# Patient Record
Sex: MALE | Race: WHITE | NOT HISPANIC OR LATINO | Employment: OTHER | URBAN - METROPOLITAN AREA
[De-identification: names, ages, dates, MRNs, and addresses within clinical notes are randomized per-mention and may not be internally consistent; named-entity substitution may affect disease eponyms.]

---

## 2019-06-25 ENCOUNTER — OFFICE VISIT (OUTPATIENT)
Dept: URGENT CARE | Facility: URGENT CARE | Age: 29
End: 2019-06-25
Payer: COMMERCIAL

## 2019-06-25 ENCOUNTER — ANCILLARY PROCEDURE (OUTPATIENT)
Dept: GENERAL RADIOLOGY | Facility: CLINIC | Age: 29
End: 2019-06-25
Attending: PHYSICIAN ASSISTANT
Payer: COMMERCIAL

## 2019-06-25 VITALS
WEIGHT: 171.8 LBS | TEMPERATURE: 98.4 F | HEART RATE: 98 BPM | DIASTOLIC BLOOD PRESSURE: 87 MMHG | SYSTOLIC BLOOD PRESSURE: 122 MMHG | OXYGEN SATURATION: 97 %

## 2019-06-25 DIAGNOSIS — R07.9 ACUTE CHEST PAIN: ICD-10-CM

## 2019-06-25 DIAGNOSIS — R07.89 MUSCULOSKELETAL CHEST PAIN: Primary | ICD-10-CM

## 2019-06-25 PROCEDURE — 93000 ELECTROCARDIOGRAM COMPLETE: CPT | Performed by: PHYSICIAN ASSISTANT

## 2019-06-25 PROCEDURE — 71046 X-RAY EXAM CHEST 2 VIEWS: CPT

## 2019-06-25 PROCEDURE — 99204 OFFICE O/P NEW MOD 45 MIN: CPT | Performed by: PHYSICIAN ASSISTANT

## 2019-06-25 ASSESSMENT — ENCOUNTER SYMPTOMS
ABDOMINAL PAIN: 0
PSYCHIATRIC NEGATIVE: 1
NAUSEA: 0
SHORTNESS OF BREATH: 0
FLANK PAIN: 0
HEADACHES: 0
SORE THROAT: 0
DIZZINESS: 0
FEVER: 0
BACK PAIN: 0
PALPITATIONS: 0
BLURRED VISION: 0
VOMITING: 0
COUGH: 0
BRUISES/BLEEDS EASILY: 0

## 2019-06-26 NOTE — PATIENT INSTRUCTIONS
Your EKG showed an incomplete right bundle branch block. As we discussed this can be a normal variant.  Your chest xray was normal.  Your chest pain is highly likely due to muscular pain given that you have tenderness on the chest wall right where your pain was located.  Recommend ice, ibuprofen. Avoid activities such as heavy lifting.  Monitor closely- if worsening chest pain, developing any shortness of breath, lightheadedness, palpitations, or any other new, concerning symptoms, go to the ER immediately.  Otherwise, follow up with your doctor if no improvement in 1 week.

## 2019-06-26 NOTE — PROGRESS NOTES
SUBJECTIVE:   Stas Hollis is a 28 year old male presenting for evaluation of   Chief Complaint   Patient presents with     Urgent Care     Pt in clinic c/o left side chest pain for the past two hours.     Chest Pain   .  Chest pain began 2 hours ago while he was sitting at his desk working on the computer. Chest pain seems to come and go- is worse with really deep inspiration, and certain movements such as getting out of the car.  Chest pain located on left side of chest.   No SOB. No cough. No leg swelling or calf pain. No PMH blood clots. No palpitations. No recent surgeries. He traveled to and from [facility], but had lots of layovers so was only sedentary for 2 hours at a time.     No recent new activities or heavy lifting. He denies chest tenderness when he touches the chest.  No abdominal pain, nausea, vomiting. No back pain.  He has not tried any treatments for chest pain.    Review of Systems   Constitutional: Negative for fever.   HENT: Negative for sore throat.    Eyes: Negative for blurred vision.   Respiratory: Negative for cough and shortness of breath.    Cardiovascular: Positive for chest pain. Negative for palpitations and leg swelling.   Gastrointestinal: Negative for abdominal pain, nausea and vomiting.   Genitourinary: Negative for flank pain.   Musculoskeletal: Negative for back pain.   Skin: Negative for rash.   Neurological: Negative for dizziness and headaches.   Endo/Heme/Allergies: Does not bruise/bleed easily.   Psychiatric/Behavioral: Negative.          PMH:  Past Medical History:   Diagnosis Date     NO ACTIVE PROBLEMS      There are no active problems to display for this patient.        Current medications:  No current outpatient medications on file.       Surgical History:  Past Surgical History:   Procedure Laterality Date     no prior surgeries         Family history:  Family History   Problem Relation Age of Onset     Cerebrovascular Disease Maternal Grandmother       Cerebrovascular Disease Maternal Grandfather          Social History:  Social History     Tobacco Use     Smoking status: Never Smoker     Smokeless tobacco: Never Used   Substance Use Topics     Alcohol use: Not on file           OBJECTIVE:  /87   Pulse 98   Temp 98.4  F (36.9  C) (Tympanic)   Wt 77.9 kg (171 lb 12.8 oz)   SpO2 97%     Physical Exam  General: alert, appears well but slightly anxious, NAD. Afebrile.  Skin: no suspicious lesions or rashes of chest.  Respiratory: No distress. Equal inspiration to bilateral bases. No crackles wheeze, rhonchi, rales.   Cardiovascular: RRR. No murmurs, clicks, gallups, or rub. No peripheral edema. Peripheral pulses 2+ bilaterally.  Gastrointestinal: Abdomen soft, nontender, BS present. No masses, organomegaly.  Musculoskeletal: there is tenderness of the left side of chest wall, under the nipple line.  Neurologic: Follows commands.   Psychiatric: mentation appears normal and affect normal/bright           Labs:  Results for orders placed or performed in visit on 06/25/19 (from the past 24 hour(s))   XR Chest 2 Views    Narrative    CHEST TWO VIEW 6/25/2019 8:06 PM     HISTORY: Acute chest pain.     COMPARISON: None.     FINDINGS: Cardiomediastinal silhouette within normal limits. No focal  airspace opacities. No pleural effusion. No pneumothorax identified.  The bones are unremarkable.       Impression    IMPRESSION: No acute cardiopulmonary abnormalities.        X-Ray was done, my findings are: no pneumothorax, effusion, or infiltrate      EKG per my read:  Rate: 86  Rhythm: sinus  Conduction: incomplete RBBB  Axis: normal  Ischemic changes/ST segments: no ST elevations  Comparison to prior: Prior EKG none on file    Overall impression: sinus rhythm with incomplete RBBB        ASSESSMENT/PLAN:      ICD-10-CM    1. Musculoskeletal chest pain R07.89    2. Acute chest pain R07.9 EKG 12-lead complete w/read - Clinics     XR Chest 2 Views        Medical Decision  Making:    Serious Comorbid Conditions:  none    Previously healthy 29yo male presenting with acute intermittent chest pain x 2 hours. Patient was vitally normal upon presentation. Differential includes but is not limited to: GERD, PE, ACS, musculoskeletal chest pain, pneumonia, pneumothorax.  I highly suspect etiology to be musculoskeletal in origin given that the pain is completely reproducible upon exam today- when I palpate the left side of chest under the nipple line, patient says this is the same kind of pain he had been having.   I did obtain EKG looking for ischemic changes. There was an incomplete RBBB, but unclear if this is his baseline or not as there are no prior EKGs on file. I highly doubt that the incomplete RBBB is reflective of ACS, however, given that he has no ACS risk factors and is young and healthy. I do not think he needs to be sent to the ED today for serial troponins.  I highly doubt PE as he denies SOB, nor does he have any PE risk factors. PERC criteria 0.   I do not think further evaluation is indicated at this poitn for patient's chest pain.   I recommend ice, ibuprofen for likely musculoskeletal chest pain.  Recommend close monitoring and if worsening or any new symptoms to go to the ED immediately. Otherwise, f/up with PCP if no improvement in 1 week.    At the end of the encounter, I discussed all available results with patient. Discussed diagnosis and treatment plan.   Return precautions provided to patient and printed below in patient instructions.  Patient understood and agreed to plan. Patient was appropriate for discharge.        Patient Instructions   Your EKG showed an incomplete right bundle branch block. As we discussed this can be a normal variant.  Your chest xray was normal.  Your chest pain is highly likely due to muscular pain given that you have tenderness on the chest wall right where your pain was located.  Recommend ice, ibuprofen. Avoid activities such as heavy  lifting.  Monitor closely- if worsening chest pain, developing any shortness of breath, lightheadedness, palpitations, or any other new, concerning symptoms, go to the ER immediately.  Otherwise, follow up with your doctor if no improvement in 1 week.              Radha Bansal PA-C  06/25/19 8:56 PM

## 2019-06-27 ENCOUNTER — OFFICE VISIT (OUTPATIENT)
Dept: CARDIOLOGY | Facility: CLINIC | Age: 29
End: 2019-06-27
Attending: INTERNAL MEDICINE
Payer: COMMERCIAL

## 2019-06-27 ENCOUNTER — PRE VISIT (OUTPATIENT)
Dept: CARDIOLOGY | Facility: CLINIC | Age: 29
End: 2019-06-27

## 2019-06-27 VITALS
WEIGHT: 173 LBS | DIASTOLIC BLOOD PRESSURE: 80 MMHG | HEIGHT: 68 IN | BODY MASS INDEX: 26.22 KG/M2 | SYSTOLIC BLOOD PRESSURE: 122 MMHG | OXYGEN SATURATION: 98 % | HEART RATE: 86 BPM

## 2019-06-27 DIAGNOSIS — I45.10 INCOMPLETE RIGHT BUNDLE BRANCH BLOCK: ICD-10-CM

## 2019-06-27 DIAGNOSIS — R07.89 MUSCULOSKELETAL CHEST PAIN: ICD-10-CM

## 2019-06-27 DIAGNOSIS — G47.30 SLEEP APNEA, UNSPECIFIED TYPE: Primary | ICD-10-CM

## 2019-06-27 PROCEDURE — G0463 HOSPITAL OUTPT CLINIC VISIT: HCPCS | Mod: ZF

## 2019-06-27 PROCEDURE — 99204 OFFICE O/P NEW MOD 45 MIN: CPT | Mod: ZP | Performed by: INTERNAL MEDICINE

## 2019-06-27 ASSESSMENT — ENCOUNTER SYMPTOMS
MUSCLE CRAMPS: 0
EXERCISE INTOLERANCE: 0
MYALGIAS: 1
BACK PAIN: 0
DECREASED CONCENTRATION: 0
MUSCLE WEAKNESS: 0
ARTHRALGIAS: 0
NECK PAIN: 1
PANIC: 0
LIGHT-HEADEDNESS: 0
ORTHOPNEA: 0
PALPITATIONS: 0
SLEEP DISTURBANCES DUE TO BREATHING: 0
SYNCOPE: 0
DEPRESSION: 0
STIFFNESS: 0
NERVOUS/ANXIOUS: 1
JOINT SWELLING: 0
HYPERTENSION: 0
INSOMNIA: 1
LEG PAIN: 0
HYPOTENSION: 0

## 2019-06-27 ASSESSMENT — PAIN SCALES - GENERAL: PAINLEVEL: NO PAIN (0)

## 2019-06-27 ASSESSMENT — MIFFLIN-ST. JEOR: SCORE: 1724.22

## 2019-06-27 NOTE — PATIENT INSTRUCTIONS
"You were seen today in the Cardiovascular Clinic at the [facility].     Cardiology Providers you saw during your visit: Dr. Vianey Rasmussen    Diagnosis:  Incomplete right bundle branch block    Results: discussed with patient    Orders:   None    Medication Changes:   None    Recommendations:   Echocardiogram  Sleep study- Please call to schedule appointment       Follow-up:   As needed  Please follow up with primary care provider for medication refills         Please feel free to call me with any questions or concerns.       Jocy Oshea RN     Questions and [phone].   First press #1 for the [employer] and then press #3 for \"Medical Questions\" to reach us Cardiology Nurses.      On Call Cardiologist for after hours or on weekends: [phone]   option #4 and ask to speak to the on-call Cardiologist.          If you need a medication refill please contact your pharmacy.  Please allow 3 business days for your refill to be completed.    "

## 2019-06-27 NOTE — LETTER
6/27/2019      RE: Stas Hollis  [address]  [place] [address]       Dear Colleague,    Thank you for the opportunity to participate in the care of your patient, Stas Hollis, at the [facility] HEART [facility]. Please see a copy of my visit note below.    I am delighted to see Stas Hollis in consultation for EKG finding.     History of Present Illness:  As you know, the patient is a 28 year old  Male who saw PCP yesterday for chest pain at rest. EKG noted to have incomplete right bundle branch block. Chest pain started while he was sitting at work, sharp, left sided, waxed/waned for several hours, increased with deep inspiration and movement. He denies any unusual physical activities leading prior to chest pain. In ED, CXR without pneumothorax. Labs normal. EKG showed incomplete RBBB. Chest pain resolved spontaneously, no recurrences.    He is  and lives with his wife, no children. They have a 75 lb dog who likes to jump on him. He takes regular walks, takes stairs up to his 5th floor office as exercise. No chest pain, dyspnea, dizziness, syncope. Does report waking up in the middle of the night for no particular reason. Wife says he snores.      The following portions of the patient's history were reviewed and updated as appropriate: allergies, current medications, past family history, past medical history, past social history, past surgical history, and the problem list.      Past Medical History:  None      Medications:   None      Allergies:  No Known Allergies      Family History:   Father - hypertension, hyperlipidemia, sleep apnea    Family History   Problem Relation Age of Onset     Cerebrovascular Disease Maternal Grandmother      Cerebrovascular Disease Maternal Grandfather        Psychosocial history:  reports that he has never smoked. He has never used smokeless tobacco.    Review of systems:   Cardiovascular: No  palpitations, chest pain, shortness of breath at rest, dyspnea with exertion, orthopnea, paroxysmal nocturia dyspnea, nocturia, dizziness, syncope.    In addition,   Constitutional: No change in weight, sleep or appetite.  Normal energy.  No fever or chills  Eyes: Negative for vision changes or eye problems  ENT: No problems with ears, nose or throat.  No difficulty swallowing.  Resp: No coughing, wheezing or shortness of breath  GI: No nausea, vomiting,  heartburn, abdominal pain, diarrhea, constipation or change in bowel habits  : No urinary frequency or dysuria, bladder or kidney problems  Musculoskeletal: No significant muscle or joint pains  Neurologic: No headaches, numbness, tingling, weakness, problems with balance or coordination  Psychiatric: No problems with anxiety, depression or mental health  Heme/immune/allergy: No history of bleeding or clotting problems or anemia.  No allergies or immune system problems  Integumentary: No rashes,worrisome lesions or skin problems      Physical examination  Vitals: 122/80, 86 bpm  BMI= 26    Constitutional: In general, the patient is a pleasant male in no apparent distress.    Eyes: PERRLA.  EOMI.  Sclerae white, not injected.  ENT/mouth: Normiocephalic and atraumatic.  Nares clear.  Pharynx without erythema or exudate.  Dentition intact.  No adenopathy.  No thyromegaly. Carotids +2/2 bilaterally without bruits.  No jugular venous distension.   Card/Vasc: The PMI is in the 5th ICS in the midclavicular line. There is no heave. Regular rate and rhythm. Normal S1, S2. No murmur, rub, click, or gallop. Pulses are normal bilaterally throughout. No peripheral edema.  Respiratory: Clear to asculation.  No ronchi, wheezes, rales.  No dullness to percussion.   GI: Abdomen is soft, nontender, nondistended. No organomegaly. No AAA.  No bruits.   Integument: No significant bruises or rashes  Neurological: The neurological examination reveal a patient who was oriented to  person, place, and time.    Psych: Normal  Heme/Lymph/Immun: no significant adenopathy      I have personally and independently reviewed the following:  EK19: sinus, incomplete RBBB      Assessment :  1. Chest pain. Non cardiac. Likely musculoskeletal.  2. Incomplete RBBB on EKG. Nonspecific. Normal exam. Consider checking for sleep apnea, echocardiogram to evaluate RV.      Plan:  2D echo  Sleep study referral    I spent a total of 30 minutes face to face with  Stas Hollis during today's office visit. Over 50% of this time was spent counseling the patient and/or coordinating care regarding management strategies.    The patient is to return pending above . The patient understood the treatment plan as outlined above.  There were no barriers to learning.      Vianey Rasmussen MD

## 2019-06-27 NOTE — PROGRESS NOTES
I am delighted to see Stas Hollis in consultation for EKG finding.     History of Present Illness:  As you know, the patient is a 28 year old  Male who saw PCP yesterday for chest pain at rest. EKG noted to have incomplete right bundle branch block. Chest pain started while he was sitting at work, sharp, left sided, waxed/waned for several hours, increased with deep inspiration and movement. He denies any unusual physical activities leading prior to chest pain. In ED, CXR without pneumothorax. Labs normal. EKG showed incomplete RBBB. Chest pain resolved spontaneously, no recurrences.    He is  and lives with his wife, no children. They have a 75 lb dog who likes to jump on him. He takes regular walks, takes stairs up to his 5th floor office as exercise. No chest pain, dyspnea, dizziness, syncope. Does report waking up in the middle of the night for no particular reason. Wife says he snores.      The following portions of the patient's history were reviewed and updated as appropriate: allergies, current medications, past family history, past medical history, past social history, past surgical history, and the problem list.      Past Medical History:  None      Medications:   None      Allergies:  No Known Allergies      Family History:   Father - hypertension, hyperlipidemia, sleep apnea    Family History   Problem Relation Age of Onset     Cerebrovascular Disease Maternal Grandmother      Cerebrovascular Disease Maternal Grandfather        Psychosocial history:  reports that he has never smoked. He has never used smokeless tobacco.    Review of systems:   Cardiovascular: No palpitations, chest pain, shortness of breath at rest, dyspnea with exertion, orthopnea, paroxysmal nocturia dyspnea, nocturia, dizziness, syncope.    In addition,   Constitutional: No change in weight, sleep or appetite.  Normal energy.  No fever or chills  Eyes: Negative for vision changes or eye problems  ENT: No problems with  ears, nose or throat.  No difficulty swallowing.  Resp: No coughing, wheezing or shortness of breath  GI: No nausea, vomiting,  heartburn, abdominal pain, diarrhea, constipation or change in bowel habits  : No urinary frequency or dysuria, bladder or kidney problems  Musculoskeletal: No significant muscle or joint pains  Neurologic: No headaches, numbness, tingling, weakness, problems with balance or coordination  Psychiatric: No problems with anxiety, depression or mental health  Heme/immune/allergy: No history of bleeding or clotting problems or anemia.  No allergies or immune system problems  Integumentary: No rashes,worrisome lesions or skin problems      Physical examination  Vitals: 122/80, 86 bpm  BMI= 26    Constitutional: In general, the patient is a pleasant male in no apparent distress.    Eyes: PERRLA.  EOMI.  Sclerae white, not injected.  ENT/mouth: Normiocephalic and atraumatic.  Nares clear.  Pharynx without erythema or exudate.  Dentition intact.  No adenopathy.  No thyromegaly. Carotids +2/2 bilaterally without bruits.  No jugular venous distension.   Card/Vasc: The PMI is in the 5th ICS in the midclavicular line. There is no heave. Regular rate and rhythm. Normal S1, S2. No murmur, rub, click, or gallop. Pulses are normal bilaterally throughout. No peripheral edema.  Respiratory: Clear to asculation.  No ronchi, wheezes, rales.  No dullness to percussion.   GI: Abdomen is soft, nontender, nondistended. No organomegaly. No AAA.  No bruits.   Integument: No significant bruises or rashes  Neurological: The neurological examination reveal a patient who was oriented to person, place, and time.    Psych: Normal  Heme/Lymph/Immun: no significant adenopathy      I have personally and independently reviewed the following:  EK19: sinus, incomplete RBBB      Assessment :  1. Chest pain. Non cardiac. Likely musculoskeletal.  2. Incomplete RBBB on EKG. Nonspecific. Normal exam. Consider checking for  sleep apnea, echocardiogram to evaluate RV.      Plan:  2D echo  Sleep study referral        I spent a total of 30 minutes face to face with  Stas Hollis during today's office visit. Over 50% of this time was spent counseling the patient and/or coordinating care regarding management strategies.      The patient is to return pending above . The patient understood the treatment plan as outlined above.  There were no barriers to learning.      Vianey Rasmussen MD

## 2019-06-27 NOTE — NURSING NOTE
Chief Complaint   Patient presents with     New Patient     29 yo male present for Incomplete right bundle branch block- abnormal EKG     Vitals were taken and medications were reconciled. DEBRA Beyer MA    7:35 AM

## 2019-07-09 ENCOUNTER — ANCILLARY PROCEDURE (OUTPATIENT)
Dept: CARDIOLOGY | Facility: CLINIC | Age: 29
End: 2019-07-09
Attending: INTERNAL MEDICINE
Payer: COMMERCIAL

## 2019-07-09 DIAGNOSIS — I45.10 INCOMPLETE RIGHT BUNDLE BRANCH BLOCK: ICD-10-CM

## 2019-07-10 ENCOUNTER — MYC MEDICAL ADVICE (OUTPATIENT)
Dept: CARDIOLOGY | Facility: CLINIC | Age: 29
End: 2019-07-10

## 2019-07-12 ENCOUNTER — OFFICE VISIT (OUTPATIENT)
Dept: SURGERY | Facility: CLINIC | Age: 29
End: 2019-07-12
Attending: DERMATOLOGY
Payer: COMMERCIAL

## 2019-07-12 VITALS
HEIGHT: 68 IN | TEMPERATURE: 98 F | SYSTOLIC BLOOD PRESSURE: 131 MMHG | DIASTOLIC BLOOD PRESSURE: 83 MMHG | BODY MASS INDEX: 26.86 KG/M2 | OXYGEN SATURATION: 99 % | HEART RATE: 96 BPM | WEIGHT: 177.2 LBS | RESPIRATION RATE: 14 BRPM

## 2019-07-12 DIAGNOSIS — D18.01 CHERRY ANGIOMA: ICD-10-CM

## 2019-07-12 DIAGNOSIS — Z86.018 HISTORY OF DYSPLASTIC NEVUS: Primary | ICD-10-CM

## 2019-07-12 DIAGNOSIS — D23.9 DILATED PORE OF WINER: ICD-10-CM

## 2019-07-12 DIAGNOSIS — L81.4 SOLAR LENTIGO: ICD-10-CM

## 2019-07-12 DIAGNOSIS — L57.8 SUN-DAMAGED SKIN: ICD-10-CM

## 2019-07-12 DIAGNOSIS — D22.9 MULTIPLE BENIGN NEVI: ICD-10-CM

## 2019-07-12 PROCEDURE — G0463 HOSPITAL OUTPT CLINIC VISIT: HCPCS

## 2019-07-12 PROCEDURE — G0463 HOSPITAL OUTPT CLINIC VISIT: HCPCS | Mod: ZF

## 2019-07-12 RX ORDER — LIDOCAINE HYDROCHLORIDE AND EPINEPHRINE 10; 10 MG/ML; UG/ML
1.5 INJECTION, SOLUTION INFILTRATION; PERINEURAL ONCE
Status: DISCONTINUED | OUTPATIENT
Start: 2019-07-12 | End: 2023-10-03

## 2019-07-12 ASSESSMENT — MIFFLIN-ST. JEOR: SCORE: 1743.14

## 2019-07-12 ASSESSMENT — PAIN SCALES - GENERAL: PAINLEVEL: NO PAIN (0)

## 2019-07-12 NOTE — LETTER
7/12/2019       RE: Stas Hollis  [address]  [place] [address]     Dear Colleague,    Thank you for referring your patient, Stas Hollis, to the [facility] CANCER [facility]IC. Please see a copy of my visit note below.    [facility] Dermatology Note      Dermatology Problem List:  1. History of dysplastic nevus  -Right buttock, mild atypia, s/p biopsy 7/24/18 at [facility]  2. Family history of skin cancer in sister, unknown type.    Encounter Date: Jul 12, 2019    CC:  Chief Complaint   Patient presents with     Oncology Clinic Visit     [facility] NEW- [facility]         History of Present Illness:  Mr. Stas Hollis is a 28 year old male with history of DN who presents in self referral for a skin check. He thinks his family has a history of unknown type of skin cancer in maternal grandfather and sister. Patient has a history of mildly dysplastic nevus on the right buttocks diagnosed last year. No other concerning moles in the past. Denies any tender, nonhealing, bleeding skin lesions. No other concerns addressed today.    Past Medical History:     Past Medical History:   Diagnosis Date     NO ACTIVE PROBLEMS      Past Surgical History:   Procedure Laterality Date     no prior surgeries         Social History:  Patient reports that he has never smoked. He has never used smokeless tobacco. Works on [employer], looking for aquatic invasive species. He is .    Family History:  Family history of skin cancer of maternal grandfather, he believed it to be melanoma but was unsure. Thinks sister had a dysplastic nevus, but he was also clarify this.     Medications:  No current outpatient medications on file.       No Known Allergies    Review of Systems:  -Constitutional: Patient is otherwise feeling well, in usual state of health.   -Skin: As above in HPI. No additional skin concerns.    Physical exam:  Vitals: /83 (BP Location: Right arm, Patient  "Position: Chair, Cuff Size: Adult Regular)   Pulse 96   Temp 98  F (36.7  C) (Oral)   Resp 14   Ht 1.727 m (5' 7.99\")   Wt 80.4 kg (177 lb 3.2 oz)   SpO2 99%   BMI 26.95 kg/m     GEN: This is a well developed, well-nourished male in no acute distress, in a pleasant mood.    SKIN: Total skin excluding the undergarment areas was performed. The exam included the head/face, neck, both arms, chest, back, abdomen, both legs, digits and/or nails, including buttocks and genital area.    - Parmar Type I-II  - Scattered brown macules on sun exposed areas.  - Dilated pore of chante left forehead.  - well healed circular scar on left medial buttock. No pigment recurrence.   - There are dome shaped bright red papules on the trunk.   - Multiple regular brown pigmented macules and papules are identified on the trunk and extremities.   - No other lesions of concern on areas examined.       Impression/Plan:  1. History of dysplastic nevus: No evidence of recurrence. No new lesions of concern. Given history of only one mildly atypical nevus, no need to follow in high risk melanoma clinic in the future. Will refer to general dermatology.     2. Family history of skin cancer: Patient will try to clarify further with family to determine what types of skin cancer.    3. Sun damaged skin with solar lentigines    Recommend sunscreens SPF #30 or greater, protective clothing and avoidance of tanning beds.    4. Benign findings including:  cherry angioma, dilated pore of chante    No further intervention required. Patient to report changes.     Patient reassured of the benign nature of these lesions.    5. Multiple clinically benign nevi, no significant atypia    No further intervention required. Patient to report changes.     Patient reassured of the benign nature of these lesions.    [facility] on close of this encounter.    Follow-up in 2-3 years in general dermatology, earlier for new or changing " lesions.       Staff Involved:  Scribe/Staff    Scribe Disclosure  I, Sulmaaster Monet, am serving as a scribe to document services personally performed by Dr. Ariana Ahn MD, based on data collection and the provider's statements to me.     Provider Disclosure:   The documentation recorded by the scribe accurately reflects the services I personally performed and the decisions made by me.    Ariana Ahn MD    Department of Dermatology  [facility]: Phone: [phone], Fax:[phone]  [facility]: Phone: [phone], Fax: [phone]

## 2019-07-12 NOTE — NURSING NOTE
"Oncology Rooming Note    July 12, 2019 1:49 PM   Stas Hollis is a 28 year old male who presents for:    Chief Complaint   Patient presents with     Oncology Clinic Visit     [facility] NEW- [facility]     Initial Vitals: /83 (BP Location: Right arm, Patient Position: Chair, Cuff Size: Adult Regular)   Pulse 96   Temp 98  F (36.7  C) (Oral)   Resp 14   Ht 1.727 m (5' 7.99\")   Wt 80.4 kg (177 lb 3.2 oz)   SpO2 99%   BMI 26.95 kg/m   Estimated body mass index is 26.95 kg/m  as calculated from the following:    Height as of this encounter: 1.727 m (5' 7.99\").    Weight as of this encounter: 80.4 kg (177 lb 3.2 oz). Body surface area is 1.96 meters squared.  No Pain (0) Comment: Data Unavailable   No LMP for male patient.  Allergies reviewed: Yes  Medications reviewed: Yes    Medications: Medication refills not needed today.  Pharmacy name entered into [employer]: [id number] - [place], [place] - [address] AT [facility] [address]    Clinical concerns: No new concerns. Anabelle was notified.      Nilesh Regan LPN            "

## 2019-07-12 NOTE — PROGRESS NOTES
"[facility] Dermatology Note      Dermatology Problem List:  1. History of dysplastic nevus  -Right buttock, mild atypia, s/p biopsy 7/24/18 at [facility]  2. Family history of skin cancer in sister, unknown type.    Encounter Date: Jul 12, 2019    CC:  Chief Complaint   Patient presents with     Oncology Clinic Visit     [facility] NEW- MELANOMA         History of Present Illness:  Mr. Stas Hollis is a 28 year old male with history of DN who presents in self referral for a skin check. He thinks his family has a history of unknown type of skin cancer in maternal grandfather and sister. Patient has a history of mildly dysplastic nevus on the right buttocks diagnosed last year. No other concerning moles in the past. Denies any tender, nonhealing, bleeding skin lesions. No other concerns addressed today.    Past Medical History:     Past Medical History:   Diagnosis Date     NO ACTIVE PROBLEMS      Past Surgical History:   Procedure Laterality Date     no prior surgeries         Social History:  Patient reports that he has never smoked. He has never used smokeless tobacco. Works on [employer], looking for aquatic invasive species. He is .    Family History:  Family history of skin cancer of maternal grandfather, he believed it to be melanoma but was unsure. Thinks sister had a dysplastic nevus, but he was also clarify this.     Medications:  No current outpatient medications on file.       No Known Allergies    Review of Systems:  -Constitutional: Patient is otherwise feeling well, in usual state of health.   -Skin: As above in HPI. No additional skin concerns.    Physical exam:  Vitals: /83 (BP Location: Right arm, Patient Position: Chair, Cuff Size: Adult Regular)   Pulse 96   Temp 98  F (36.7  C) (Oral)   Resp 14   Ht 1.727 m (5' 7.99\")   Wt 80.4 kg (177 lb 3.2 oz)   SpO2 99%   BMI 26.95 kg/m    GEN: This is a well developed, well-nourished male in no acute distress, " in a pleasant mood.    SKIN: Total skin excluding the undergarment areas was performed. The exam included the head/face, neck, both arms, chest, back, abdomen, both legs, digits and/or nails, including buttocks and genital area.    - Parmar Type I-II  - Scattered brown macules on sun exposed areas.  - Dilated pore of chante left forehead.  - well healed circular scar on left medial buttock. No pigment recurrence.   - There are dome shaped bright red papules on the trunk.   - Multiple regular brown pigmented macules and papules are identified on the trunk and extremities.   - No other lesions of concern on areas examined.       Impression/Plan:  1. History of dysplastic nevus: No evidence of recurrence. No new lesions of concern. Given history of only one mildly atypical nevus, no need to follow in high risk melanoma clinic in the future. Will refer to general dermatology.     2. Family history of skin cancer: Patient will try to clarify further with family to determine what types of skin cancer.    3. Sun damaged skin with solar lentigines    Recommend sunscreens SPF #30 or greater, protective clothing and avoidance of tanning beds.    4. Benign findings including:  cherry angioma, dilated pore of chante    No further intervention required. Patient to report changes.     Patient reassured of the benign nature of these lesions.    5. Multiple clinically benign nevi, no significant atypia    No further intervention required. Patient to report changes.     Patient reassured of the benign nature of these lesions.    [facility] on close of this encounter.    Follow-up in 2-3 years in general dermatology, earlier for new or changing lesions.       Staff Involved:  Scribe/Staff    Scribe Disclosure  I, Sulma Monet, am serving as a scribe to document services personally performed by Dr. Ariana Ahn MD, based on data collection and the provider's statements to me.     Provider  Disclosure:   The documentation recorded by the scribe accurately reflects the services I personally performed and the decisions made by me.    Ariana Ahn MD    Department of Dermatology  [facility]: Phone: [phone], Fax:[phone]  [facility]: Phone: [phone], Fax: [phone]

## 2020-03-11 ENCOUNTER — HEALTH MAINTENANCE LETTER (OUTPATIENT)
Age: 30
End: 2020-03-11

## 2021-01-03 ENCOUNTER — HEALTH MAINTENANCE LETTER (OUTPATIENT)
Age: 31
End: 2021-01-03

## 2021-04-25 ENCOUNTER — HEALTH MAINTENANCE LETTER (OUTPATIENT)
Age: 31
End: 2021-04-25

## 2021-05-05 ENCOUNTER — OFFICE VISIT (OUTPATIENT)
Dept: FAMILY MEDICINE | Facility: CLINIC | Age: 31
End: 2021-05-05
Payer: COMMERCIAL

## 2021-05-05 VITALS
RESPIRATION RATE: 12 BRPM | SYSTOLIC BLOOD PRESSURE: 125 MMHG | HEART RATE: 92 BPM | DIASTOLIC BLOOD PRESSURE: 81 MMHG | OXYGEN SATURATION: 98 % | WEIGHT: 185 LBS | TEMPERATURE: 97.7 F | BODY MASS INDEX: 28.04 KG/M2 | HEIGHT: 68 IN

## 2021-05-05 DIAGNOSIS — M25.675 FOOT STIFFNESS, LEFT: ICD-10-CM

## 2021-05-05 DIAGNOSIS — Z13.220 SCREENING FOR HYPERLIPIDEMIA: ICD-10-CM

## 2021-05-05 DIAGNOSIS — L98.9 SKIN LESION: ICD-10-CM

## 2021-05-05 DIAGNOSIS — Z76.89 ENCOUNTER TO ESTABLISH CARE: Primary | ICD-10-CM

## 2021-05-05 DIAGNOSIS — Z80.8 FAMILY HISTORY OF SKIN CANCER: ICD-10-CM

## 2021-05-05 LAB
CHOLEST SERPL-MCNC: 158 MG/DL
HDLC SERPL-MCNC: 53 MG/DL
LDLC SERPL CALC-MCNC: 90 MG/DL
NONHDLC SERPL-MCNC: 105 MG/DL
TRIGL SERPL-MCNC: 75 MG/DL

## 2021-05-05 PROCEDURE — 80061 LIPID PANEL: CPT | Performed by: PHYSICIAN ASSISTANT

## 2021-05-05 PROCEDURE — 99203 OFFICE O/P NEW LOW 30 MIN: CPT | Performed by: PHYSICIAN ASSISTANT

## 2021-05-05 PROCEDURE — 36415 COLL VENOUS BLD VENIPUNCTURE: CPT | Performed by: PHYSICIAN ASSISTANT

## 2021-05-05 ASSESSMENT — MIFFLIN-ST. JEOR: SCORE: 1768.65

## 2021-05-05 NOTE — PROGRESS NOTES
"    Assessment & Plan     Encounter to establish care  Skin lesion  Family history of skin cancer  Grandfather with melanoma history and sister with history of precancerous skin lesion. Last dermatology appointment 7/24/2018 noted to have multiple benign nevi, lentigines, cherry angioma, and sun damaged skin. Biopsy showed mildly dysplastic nevus. Was advised to return to care in 1 year for skin check. Referral placed today.   - DERMATOLOGY ADULT REFERRAL; Future    Foot stiffness, left  Not overly bothersome right now. Symptoms resolve after a few steps in the morning. If worsening symptoms to return to care. Could consider massage with frozen water bottle, PT for stretching/strengthening.     Screening for hyperlipidemia  - Lipid panel reflex to direct LDL Non-fasting    30 minutes spent on the date of the encounter doing chart review, history and exam, documentation and further activities per the note     BMI:   Estimated body mass index is 28.13 kg/m  as calculated from the following:    Height as of this encounter: 1.727 m (5' 8\").    Weight as of this encounter: 83.9 kg (185 lb).   Weight management plan: Discussed healthy diet and exercise guidelines      Return in about 1 year (around 5/5/2022) for Routine Visit, Physical Exam, Lab Work, BP Recheck.    Silvio Odom PA-C  [facility]   Pat is a 30 year old who presents for the following health issues     HPI   Encounter to establish care  Previously followed with dermatology. Reports family history of skin cancer. Grandfather with melanoma and sister with precancerous mole. Last derm evaluation 7/2019. Had biopsy done showing mildy dysplastic nevus. Advised to follow up in 1 year for repeat skin check. Requesting referral today.     Other: waking up in the morning notices some left foot stiffness that goes away after a few steps. Going on for a few months. No swelling. No injury or trauma. Denies overuse.     Social: " "   - wife  Works at [employer]    PMH:  Incomplete RBBB - had normal echo and was cleared by cardiology.     Family:   Grandfather with melanoma  Sister with precancerous mole       Review of Systems   Constitutional, HEENT, cardiovascular, pulmonary, gi and gu systems are negative, except as otherwise noted.      Objective    /81 (BP Location: Right arm, Patient Position: Sitting, Cuff Size: Adult Regular)   Pulse 92   Temp 97.7  F (36.5  C) (Tympanic)   Resp 12   Ht 1.727 m (5' 8\")   Wt 83.9 kg (185 lb)   SpO2 98%   BMI 28.13 kg/m    Body mass index is 28.13 kg/m .  Physical Exam  Vitals signs and nursing note reviewed.   Constitutional:       Appearance: Normal appearance.   Pulmonary:      Effort: Pulmonary effort is normal.   Neurological:      General: No focal deficit present.      Mental Status: He is alert.   Psychiatric:         Mood and Affect: Mood normal.         Behavior: Behavior normal.        Results for orders placed or performed in visit on 05/05/21   Lipid panel reflex to direct LDL Non-fasting     Status: None   Result Value Ref Range    Cholesterol 158 <200 mg/dL    Triglycerides 75 <150 mg/dL    HDL Cholesterol 53 >39 mg/dL    LDL Cholesterol Calculated 90 <100 mg/dL    Non HDL Cholesterol 105 <130 mg/dL               "

## 2021-07-01 ENCOUNTER — OFFICE VISIT (OUTPATIENT)
Dept: DERMATOLOGY | Facility: CLINIC | Age: 31
End: 2021-07-01
Attending: PHYSICIAN ASSISTANT
Payer: COMMERCIAL

## 2021-07-01 DIAGNOSIS — Z12.83 SKIN EXAM FOR MALIGNANT NEOPLASM: Primary | ICD-10-CM

## 2021-07-01 DIAGNOSIS — D18.01 CHERRY ANGIOMA: ICD-10-CM

## 2021-07-01 DIAGNOSIS — L82.1 SEBORRHEIC KERATOSES: ICD-10-CM

## 2021-07-01 DIAGNOSIS — D22.9 MULTIPLE NEVI: ICD-10-CM

## 2021-07-01 DIAGNOSIS — L98.9 SKIN LESION: ICD-10-CM

## 2021-07-01 PROCEDURE — 99203 OFFICE O/P NEW LOW 30 MIN: CPT | Mod: GC | Performed by: STUDENT IN AN ORGANIZED HEALTH CARE EDUCATION/TRAINING PROGRAM

## 2021-07-01 ASSESSMENT — PAIN SCALES - GENERAL: PAINLEVEL: NO PAIN (0)

## 2021-07-01 NOTE — PATIENT INSTRUCTIONS
Patient Education     Checking for Skin Cancer  You can find cancer early by checking your skin each month. There are 3 kinds of skin cancer. They are melanoma, basal cell carcinoma, and squamous cell carcinoma. Doing monthly skin checks is the best way to find new marks or skin changes. Follow the instructions below for checking your skin.   The ABCDEs of checking moles for melanoma   Check your moles or growths for signs of melanoma using ABCDE:     Asymmetry: the sides of the mole or growth don t match    Border: the edges are ragged, notched, or blurred    Color: the color within the mole or growth varies    Diameter: the mole or growth is larger than 6 mm (size of a pencil eraser)    Evolving: the size, shape, or color of the mole or growth is changing (evolving is not shown in the images below)    Checking for other types of skin cancer  Basal cell carcinoma or squamous cell carcinoma have symptoms such as:       A spot or mole that looks different from all other marks on your skin    Changes in how an area feels, such as itching, tenderness, or pain    Changes in the skin's surface, such as oozing, bleeding, or scaliness    A sore that does not heal    New swelling or redness beyond the border of a mole    Who s at risk?  Anyone can get skin cancer. But you are at greater risk if you have:     Fair skin, light-colored hair, or light-colored eyes    Many moles or abnormal moles on your skin    A history of sunburns from sunlight or tanning beds    A family history of skin cancer    A history of exposure to radiation or chemicals    A weakened immune system  If you have had skin cancer in the past, you are at risk for recurring skin cancer.   How to check your skin  Do your monthly skin checkups in front of a full-length mirror. Check all parts of your body, including your:     Head (ears, face, neck, and scalp)    Torso (front, back, and sides)    Arms (tops, undersides, upper, and lower armpits)    Hands  (palms, backs, and fingers, including under the nails)    Buttocks and genitals    Legs (front, back, and sides)    Feet (tops, soles, toes, including under the nails, and between toes)  If you have a lot of moles, take digital photos of them each month. Make sure to take photos both up close and from a distance. These can help you see if any moles change over time.   Most skin changes are not cancer. But if you see any changes in your skin, call your doctor right away. Only he or she can diagnose a problem. If you have skin cancer, seeing your doctor can be the first step toward getting the treatment that could save your life.   [employer] last reviewed this educational content on 4/1/2019 2000-2020 [employer]. [address] [place] [address]. All rights reserved. This information is not intended as a substitute for professional medical care. Always follow your healthcare professional's instructions.       When should I call my doctor?    If you are worsening or not improving, please, contact us or seek urgent care as noted below.     Who should I call with questions (adults)?    [facility] (adult and pediatric): [phone]     [facility] (adult): [phone]    For urgent needs outside of business hours call the [facility] at [phone] and ask for the dermatology resident on call    If this is a medical emergency and you are unable to reach an ER, Call [phone]      Who should I call with questions (pediatric)?  [facility]- Pediatric Dermatology  Dr. Shivani Somers, Dr. Nhi Ball, Dr. Zo Castillo, Mare Salinas, [place]  Dr. Linda Carranza, Dr. Chela Morales & Dr. Vicente Isaac  Non Urgent  Nurse Triage Line; [phone]- Keena and Bren CHENG Care Coordinatormeño Ramirez (/Complex ) [phone]    If you need a prescription refill, please contact your  pharmacy. Refills are approved or denied by our Physicians during normal business hours, Monday through Fridays  Per office policy, refills will not be granted if you have not been seen within the past year (or sooner depending on your child's condition)    Scheduling Information:  Pediatric Appointment Scheduling and Call Center [phone]  Radiology Scheduling- [phone]  Sedation Unit Scheduling- [phone]  [place] Scheduling- [facility] [phone]; Pediatric Dermatology [phone]  Main  Services: [phone]  [place]er: [phone]  Macanese: [phone]  Hmong/Tongan/Elias: [phone]  Preadmission Nursing Department Fax Number: [phone] (Fax all pre-operative paperwork to this number)    For urgent matters arising during evenings, weekends, or holidays that cannot wait for normal business hours please call [phone] and ask for the Dermatology Resident On-Call to be paged.

## 2021-07-01 NOTE — PROGRESS NOTES
[facility] Dermatology Note   Encounter Date: Jul 1, 2021  Office visit    Dermatology Problem List:    Last FBSE: 07/01/21    # Family history of melanoma  - grandfather  # Benign Bx  - dysplastic nevus (mild), R buttock, s/p shave bx 7/24/18    ___________________________________________    Assessment & Plan:    # Multiple clinically banal appearing nevi  - Discussed the possible etiologies, clinical course, and management options of this benign condition with the patient.  - Reviewed the ABCDEs of melanoma  - Recommend daily sunscreen use with SPF at least 30    # Seborrheic keratoses  # Cherry angiomata  - Discussed the possible etiologies, clinical course, and management options of this benign condition with the patient.  - Reassurance provided     Procedures Performed:  None    Follow-up: 3 years    Staff Involved:  Patient was seen and staffed with attending physician Dr. Arti Patel MD  Med/Derm Resident PGY-4  P:[phone]    Staff Addendum:  I, Melania Chi MD, saw this patient with the resident and agree with the resident s findings and plan of care as documented in the resident s note.    ___________________________________________      CC: Skin Check (Lottie is here today for a skin check. He does not have any areas of concern.)      HPI:  Mr. Stas Hollis is a(n) 30 year old male who presents to clinic today for a FBSE  - no major areas of concern. Reports a couple moles that he periodically checks. Denies any sores that wont heal or go away. Wears sunscreen daily. Grew up in [place].  - otherwise feeling well in usual state of health    Physical exam:  General: in no acute distress, well-developed, well-nourished  Skin:  - skin type: medium fair  - multiple light brown to tan papules with reassuring pigment network on dermoscopy on trunk, arms, face, and back  - light brown macules on sun exposed skin  - tan to light brown waxy stuck on papules and plaques on  trunk and extremities  - red or purple papules that linda with diascopy on back and extremities  - No other lesions of concern on areas examined.     Medications:  No current outpatient medications on file.     Current Facility-Administered Medications   Medication     lidocaine 1% with EPINEPHrine 1:100,000 injection 1.5 mL     lidocaine 1% with EPINEPHrine 1:100,000 injection 1.5 mL      Past Medical History:   There is no problem list on file for this patient.    Past Medical History:   Diagnosis Date     NO ACTIVE PROBLEMS        CC Silvio Odom PA-C  [address] [place] [address] on close of this encounter.

## 2021-07-01 NOTE — LETTER
7/1/2021       RE: Stas Hollis  [address]  [place] [address]     Dear Colleague,    Thank you for referring your patient, Stas Hollis, to the [facility] [place] at [facility]. Please see a copy of my visit note below.    [facility] Dermatology Note   Encounter Date: Jul 1, 2021  Office visit    Dermatology Problem List:    Last FBSE: 07/01/21    # Family history of melanoma  - grandfather  # Benign Bx  - dysplastic nevus (mild), R buttock, s/p shave bx 7/24/18    ___________________________________________    Assessment & Plan:    # Multiple clinically banal appearing nevi  - Discussed the possible etiologies, clinical course, and management options of this benign condition with the patient.  - Reviewed the ABCDEs of melanoma  - Recommend daily sunscreen use with SPF at least 30    # Seborrheic keratoses  # Cherry angiomata  - Discussed the possible etiologies, clinical course, and management options of this benign condition with the patient.  - Reassurance provided     Procedures Performed:  None    Follow-up: 3 years    Staff Involved:  Patient was seen and staffed with attending physician Dr. Arti Patel MD  Med/Derm Resident PGY-4  P:[phone]    Staff Addendum:  I, Melania Chi MD, saw this patient with the resident and agree with the resident s findings and plan of care as documented in the resident s note.    ___________________________________________      CC: Skin Check (Lottie is here today for a skin check. He does not have any areas of concern.)      HPI:  Mr. Stas Hollis is a(n) 30 year old male who presents to clinic today for a FBSE  - no major areas of concern. Reports a couple moles that he periodically checks. Denies any sores that wont heal or go away. Wears sunscreen daily. Grew up in [place].  - otherwise feeling well in usual state of  health    Physical exam:  General: in no acute distress, well-developed, well-nourished  Skin:  - skin type: medium fair  - multiple light brown to tan papules with reassuring pigment network on dermoscopy on trunk, arms, face, and back  - light brown macules on sun exposed skin  - tan to light brown waxy stuck on papules and plaques on trunk and extremities  - red or purple papules that linda with diascopy on back and extremities  - No other lesions of concern on areas examined.     Medications:  No current outpatient medications on file.     Current Facility-Administered Medications   Medication     lidocaine 1% with EPINEPHrine 1:100,000 injection 1.5 mL     lidocaine 1% with EPINEPHrine 1:100,000 injection 1.5 mL      Past Medical History:   There is no problem list on file for this patient.    Past Medical History:   Diagnosis Date     NO ACTIVE PROBLEMS        MEGAN Odom PA-C  [address] [place] [address] on close of this encounter.

## 2021-07-01 NOTE — NURSING NOTE
Dermatology Rooming Note    Stas Hollis's goals for this visit include:   Chief Complaint   Patient presents with     Skin Check     Pat is here today for a skin check. He does not have any areas of concern.     Yovanny Dixon, CMA

## 2021-08-24 ENCOUNTER — OFFICE VISIT (OUTPATIENT)
Dept: PEDIATRICS | Facility: CLINIC | Age: 31
End: 2021-08-24
Payer: COMMERCIAL

## 2021-08-24 VITALS
OXYGEN SATURATION: 99 % | WEIGHT: 169.3 LBS | RESPIRATION RATE: 16 BRPM | SYSTOLIC BLOOD PRESSURE: 103 MMHG | TEMPERATURE: 98.1 F | HEART RATE: 85 BPM | DIASTOLIC BLOOD PRESSURE: 70 MMHG | BODY MASS INDEX: 25.74 KG/M2

## 2021-08-24 DIAGNOSIS — M79.671 RIGHT FOOT PAIN: Primary | ICD-10-CM

## 2021-08-24 PROCEDURE — 99213 OFFICE O/P EST LOW 20 MIN: CPT | Performed by: NURSE PRACTITIONER

## 2021-08-24 NOTE — PATIENT INSTRUCTIONS
I recommend taking ibuprofen 400-600 mg with food three times daily for anti-inflammatory effect. After 3 days, can take as needed for pain    Try to avoid unnecessary activity. Rest, ice and elevate when able    If not improving, schedule physical therapy  If worsening, go to an orthopedic walk in clinic    Walk In Ortho clinics:    [facility]  [address] [place] [address]  [phone]  Open daily 8 am - 8 pm    [facility]  [address] [place], [place] [id number] [phone]  Open 8 am - 8 pm

## 2021-08-24 NOTE — PROGRESS NOTES
Assessment & Plan     Right foot pain  - No warmth or tenderness to light touch to suggest infection or gout. No history of injury or deficits to warrant imaging. Suspect tendonitis. See AVS for treatment plan  - [facility]erral; Future        Return in about 8 months (around 4/24/2022) for Physical Exam.    DENISE Nix CNP  [facility] ANGELIA Tafoya is a 30 year old who presents for the following health issues     HPI     Musculoskeletal problem/pain  Onset/Duration: one week ago or so, gets worse throughout the day, trouble sleeping last night due to pain.  Description  Location: foot - right-top of foot  Joint Swelling: no  Redness: no  Pain: YES  Warmth: no  Intensity:  Mild-moderate  Progression of Symptoms:  Worsening a little bit  Accompanying signs and symptoms:   Fevers: no  Numbness/tingling/weakness: no  History  Trauma to the area: no  Recent illness:  no  Previous similar problem: no  Previous evaluation:  no  Precipitating or alleviating factors:  Aggravating factors include: walking and climbing stairs  Therapies tried and outcome: stretching-a little bit    Developed pain to right midfoot, dorsal aspect approximately 1 week ago  No history of injury  Pain with activity  Worse as the day progresses  Trying to rest, elevate  Has not taken anything for pain    Review of Systems   Constitutional, HEENT, cardiovascular, pulmonary, gi and gu systems are negative, except as otherwise noted.      Objective    /70   Pulse 85   Temp 98.1  F (36.7  C) (Tympanic)   Resp 16   Wt 76.8 kg (169 lb 4.8 oz)   SpO2 99%   BMI 25.74 kg/m    Body mass index is 25.74 kg/m .  Physical Exam   GENERAL: healthy, alert and no distress  EXT: Gait intact, plantar/dorsal flexion intact, tenderness right foot dorsal aspect 2-4 distal metatarsals

## 2021-09-14 NOTE — PROGRESS NOTES
[facility] Initial Evaluation    Subjective:  Stas Hollis is a 30 year old male with a right foot condition. Mechanism of injury: Unknown cause. Where: (home, work, MVA, community, recreation/sport, unknown, other): NA. Onset of symptoms: August 2021, PT order date: August 2021. Location of symptoms: right dorsum of foot, medial. Pain level on number scale: 2-6/10. Quality of pain: sharp. Associated symptoms: denies numbness and tingling. Pain frequency (constant/intermittent): constant. Symptoms are exacerbated by: walking, stairs. Symptoms are relieved by: rest, elevation, advil. Progression of symptoms since onset (same/better/worse): better. Special tests (x-ray, MRI, CT scan, EMG, bone scan): None. Previous treatment: None. Improvement with previous treatment: NA. General health as reported by patient is good. Pertinent medical history includes: See Epic. Medical allergies: see Epic. Other pertinent surgeries: see Epic. Current medications: See Epic. 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 Patient is (working in normal job without restrictions/working in normal job with restrictions/working in an alternate job/not working due to present treatment problem): working in normal job. Primary job tasks: computer work, prolonged sitting. Barriers at home/work: None reported by patient. Red flags: None reported by patient.    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)  What are your primary job tasks: computer work, prolonged sitting    Objective  Gait:  Normal    Bilateral ankle AROM WNL    Ankle Strength (* =  pain) Right Left   DF 5-/5 5/5   PF 5-/5 5/5   INV 5/5 5/5   EV 5/5 5/5     Palpation Tenderness:  No palpation tenderness    Lumbar AROM: FL = WNL and no effect, EXT = min loss and increased foot pain, right SG = min loss and no effect, left SG = WNL and increased foot pain    Unsteady SLS on right with firm surface and eyes open    Single leg heel raise on right = min loss - full strength after repeated lumbar EXT in prone  Full right ankle strength after repeated EXT in prone exercises    Assessment/Plan:    Patient is a 30 year old male with right foot complaints.    Patient has the following significant findings with corresponding treatment plan.                Diagnosis 1:  Right foot pain - possible lumbar derangement  Pain -  manual therapy, self management, education, directional preference exercise and home program  Decreased ROM/flexibility - manual therapy and therapeutic exercise  Decreased strength - therapeutic exercise and therapeutic activities  Impaired balance - neuro re-education and therapeutic activities  Decreased proprioception - neuro re-education and therapeutic activities  Impaired muscle performance - neuro re-education  Decreased function - therapeutic activities  Impaired posture - neuro re-education    Previous and current functional limitations:  (See Goal Flow Sheet for this information)    Short term and Long term goals: (See Goal Flow Sheet for this information)     Communication ability:  Patient appears to be able to clearly communicate and understand verbal and written communication and follow directions correctly.  Treatment Explanation - The following has been discussed with the patient:   RX ordered/plan of care  Anticipated outcomes  Possible risks and side effects  This patient would benefit from PT intervention to resume normal activities.   Rehab potential is good.    Frequency:  1 X week, once daily  Duration:  for 4 weeks tapering to 2 X a month over 1 month  Discharge  Plan:  Achieve all LTG.  Independent in home treatment program.  Reach maximal therapeutic benefit.    Please refer to the daily flowsheet for treatment today, total treatment time and time spent performing 1:1 timed codes.

## 2021-09-16 ENCOUNTER — THERAPY VISIT (OUTPATIENT)
Dept: PHYSICAL THERAPY | Facility: CLINIC | Age: 31
End: 2021-09-16
Attending: NURSE PRACTITIONER
Payer: COMMERCIAL

## 2021-09-16 DIAGNOSIS — M79.671 RIGHT FOOT PAIN: ICD-10-CM

## 2021-09-16 PROCEDURE — 97161 PT EVAL LOW COMPLEX 20 MIN: CPT | Mod: GP | Performed by: PHYSICAL THERAPIST

## 2021-09-16 PROCEDURE — 97110 THERAPEUTIC EXERCISES: CPT | Mod: GP | Performed by: PHYSICAL THERAPIST

## 2021-09-16 PROCEDURE — 97530 THERAPEUTIC ACTIVITIES: CPT | Mod: GP | Performed by: PHYSICAL THERAPIST

## 2021-09-23 ENCOUNTER — THERAPY VISIT (OUTPATIENT)
Dept: PHYSICAL THERAPY | Facility: CLINIC | Age: 31
End: 2021-09-23
Payer: COMMERCIAL

## 2021-09-23 DIAGNOSIS — M79.671 RIGHT FOOT PAIN: ICD-10-CM

## 2021-09-23 PROCEDURE — 97530 THERAPEUTIC ACTIVITIES: CPT | Mod: GP | Performed by: PHYSICAL THERAPIST

## 2021-09-23 PROCEDURE — 97110 THERAPEUTIC EXERCISES: CPT | Mod: GP | Performed by: PHYSICAL THERAPIST

## 2021-09-30 ENCOUNTER — THERAPY VISIT (OUTPATIENT)
Dept: PHYSICAL THERAPY | Facility: CLINIC | Age: 31
End: 2021-09-30
Payer: COMMERCIAL

## 2021-09-30 DIAGNOSIS — M79.671 RIGHT FOOT PAIN: ICD-10-CM

## 2021-09-30 PROCEDURE — 97110 THERAPEUTIC EXERCISES: CPT | Mod: GP | Performed by: PHYSICAL THERAPIST

## 2021-09-30 PROCEDURE — 97112 NEUROMUSCULAR REEDUCATION: CPT | Mod: GP | Performed by: PHYSICAL THERAPIST

## 2021-10-10 ENCOUNTER — HEALTH MAINTENANCE LETTER (OUTPATIENT)
Age: 31
End: 2021-10-10

## 2021-10-26 ENCOUNTER — THERAPY VISIT (OUTPATIENT)
Dept: PHYSICAL THERAPY | Facility: CLINIC | Age: 31
End: 2021-10-26
Payer: COMMERCIAL

## 2021-10-26 DIAGNOSIS — M79.671 RIGHT FOOT PAIN: ICD-10-CM

## 2021-10-26 PROCEDURE — 97110 THERAPEUTIC EXERCISES: CPT | Mod: GP | Performed by: PHYSICAL THERAPIST

## 2021-10-26 PROCEDURE — 97112 NEUROMUSCULAR REEDUCATION: CPT | Mod: GP | Performed by: PHYSICAL THERAPIST

## 2021-10-26 NOTE — PROGRESS NOTES
DISCHARGE REPORT    Progress reporting period is from 9/16/21 to 10/26/21.       SUBJECTIVE  Subjective changes noted by patient:  Patient reports improving pain symptoms, ROM, strength and overall function. No pain using stairs. Consistent with HEP. Improved posture awareness.     Current Pain level: 0/10.     Initial Pain level: 6/10.   Changes in function:  Yes (See Goal flowsheet attached for changes in current functional level)  Adverse reaction to treatment or activity: None    OBJECTIVE  Changes noted in objective findings:  Yes,   Objective: lumbar AROM WNL throughout and no pain; right ankle/foot AROM WNL     ASSESSMENT/PLAN  Updated problem list and treatment plan: Diagnosis 1:  right foot pain - possible lumbar derangement    STG/LTGs have been met or progress has been made towards goals:  Yes (See Goal flow sheet completed today.)  Assessment of Progress: The patient's condition is improving.  Self Management Plans:  Patient is independent in a home treatment program.  Patient is independent in self management of symptoms.    Stas continues to require the following intervention to meet STG and LTG's:  PT intervention is no longer required to meet STG/LTG.    Recommendations:  This patient is ready to be discharged from therapy and continue their home treatment program.    Please refer to the daily flowsheet for treatment today, total treatment time and time spent performing 1:1 timed codes.

## 2021-11-17 ENCOUNTER — IMMUNIZATION (OUTPATIENT)
Dept: NURSING | Facility: CLINIC | Age: 31
End: 2021-11-17
Payer: COMMERCIAL

## 2021-11-17 PROCEDURE — 0064A COVID-19,PF,MODERNA (18+ YRS BOOSTER .25ML): CPT

## 2021-11-17 PROCEDURE — 91306 COVID-19,PF,MODERNA (18+ YRS BOOSTER .25ML): CPT

## 2022-03-03 ENCOUNTER — VIRTUAL VISIT (OUTPATIENT)
Dept: FAMILY MEDICINE | Facility: CLINIC | Age: 32
End: 2022-03-03
Payer: COMMERCIAL

## 2022-03-03 DIAGNOSIS — F43.23 ADJUSTMENT DISORDER WITH MIXED ANXIETY AND DEPRESSED MOOD: Primary | ICD-10-CM

## 2022-03-03 PROCEDURE — 99214 OFFICE O/P EST MOD 30 MIN: CPT | Mod: GT | Performed by: FAMILY MEDICINE

## 2022-03-03 RX ORDER — ESCITALOPRAM OXALATE 10 MG/1
10 TABLET ORAL DAILY
Qty: 30 TABLET | Refills: 1 | Status: SHIPPED | OUTPATIENT
Start: 2022-03-03 | End: 2022-03-17

## 2022-03-03 ASSESSMENT — ANXIETY QUESTIONNAIRES
5. BEING SO RESTLESS THAT IT IS HARD TO SIT STILL: SEVERAL DAYS
3. WORRYING TOO MUCH ABOUT DIFFERENT THINGS: MORE THAN HALF THE DAYS
GAD7 TOTAL SCORE: 13
6. BECOMING EASILY ANNOYED OR IRRITABLE: MORE THAN HALF THE DAYS
1. FEELING NERVOUS, ANXIOUS, OR ON EDGE: SEVERAL DAYS
2. NOT BEING ABLE TO STOP OR CONTROL WORRYING: MORE THAN HALF THE DAYS
GAD7 TOTAL SCORE: 13
7. FEELING AFRAID AS IF SOMETHING AWFUL MIGHT HAPPEN: MORE THAN HALF THE DAYS
4. TROUBLE RELAXING: NEARLY EVERY DAY
GAD7 TOTAL SCORE: 13
7. FEELING AFRAID AS IF SOMETHING AWFUL MIGHT HAPPEN: MORE THAN HALF THE DAYS

## 2022-03-03 ASSESSMENT — PATIENT HEALTH QUESTIONNAIRE - PHQ9
SUM OF ALL RESPONSES TO PHQ QUESTIONS 1-9: 13
SUM OF ALL RESPONSES TO PHQ QUESTIONS 1-9: 13

## 2022-03-03 NOTE — PATIENT INSTRUCTIONS
1) Start escitalopram 10mg daily and you can reduce the dose to 1/2 tablet (5mg) daily if you are having difficulty with upset stomach or significant anxiety initially.   2) Follow up with me in two weeks.       Patient Education     Patient Education    Escitalopram Oral solution    Escitalopram Oral tablet  Escitalopram Oral tablet  What is this medicine?  ESCITALOPRAM (es sye DAWNA oh pram) is used to treat depression and certain types of anxiety.  This medicine may be used for other purposes; ask your health care provider or pharmacist if you have questions.  What should I tell my health care provider before I take this medicine?  They need to know if you have any of these conditions:    bipolar disorder or a family history of bipolar disorder    diabetes    glaucoma    heart disease    kidney or liver disease    receiving electroconvulsive therapy    seizures (convulsions)    suicidal thoughts, plans, or attempt by you or a family member    an unusual or allergic reaction to escitalopram, the related drug citalopram, other medicines, foods, dyes, or preservatives    pregnant or trying to become pregnant    breast-feeding  How should I use this medicine?  Take this medicine by mouth with a glass of water. Follow the directions on the prescription label. You can take it with or without food. If it upsets your stomach, take it with food. Take your medicine at regular intervals. Do not take it more often than directed. Do not stop taking this medicine suddenly except upon the advice of your doctor. Stopping this medicine too quickly may cause serious side effects or your condition may worsen.  A special MedGuide will be given to you by the pharmacist with each prescription and refill. Be sure to read this information carefully each time.  Talk to your pediatrician regarding the use of this medicine in children. Special care may be needed.  Overdosage: If you think you have taken too much of this medicine contact a  poison control center or emergency room at once.  NOTE: This medicine is only for you. Do not share this medicine with others.  What if I miss a dose?  If you miss a dose, take it as soon as you can. If it is almost time for your next dose, take only that dose. Do not take double or extra doses.  What may interact with this medicine?  Do not take this medicine with any of the following medications:    certain medicines for fungal infections like fluconazole, itraconazole, ketoconazole, posaconazole, voriconazole    cisapride    citalopram    dofetilide    dronedarone    linezolid    MAOIs like Carbex, Eldepryl, Marplan, Nardil, and Parnate    methylene blue (injected into a vein)    pimozide    thioridazine    ziprasidone  This medicine may also interact with the following medications:    alcohol    aspirin and aspirin-like medicines    carbamazepine    certain medicines for depression, anxiety, or psychotic disturbances    certain medicines for migraine headache like almotriptan, eletriptan, frovatriptan, naratriptan, rizatriptan, sumatriptan, zolmitriptan    certain medicines for sleep    certain medicines that treat or prevent blood clots like warfarin, enoxaparin, dalteparin    cimetidine    diuretics    fentanyl    furazolidone    isoniazid    lithium    metoprolol    NSAIDs, medicines for pain and inflammation, like ibuprofen or naproxen    other medicines that prolong the QT interval (cause an abnormal heart rhythm)    procarbazine    rasagiline    supplements like [place]'s wort, kava kava, valerian    tramadol    tryptophan  This list may not describe all possible interactions. Give your health care provider a list of all the medicines, herbs, non-prescription drugs, or dietary supplements you use. Also tell them if you smoke, drink alcohol, or use illegal drugs. Some items may interact with your medicine.  What should I watch for while using this medicine?  Tell your doctor if your symptoms do not get  better or if they get worse. Visit your doctor or health care professional for regular checks on your progress. Because it may take several weeks to see the full effects of this medicine, it is important to continue your treatment as prescribed by your doctor.  Patients and their families should watch out for new or worsening thoughts of suicide or depression. Also watch out for sudden changes in feelings such as feeling anxious, agitated, panicky, irritable, hostile, aggressive, impulsive, severely restless, overly excited and hyperactive, or not being able to sleep. If this happens, especially at the beginning of treatment or after a change in dose, call your health care professional.  You may get drowsy or dizzy. Do not drive, use machinery, or do anything that needs mental alertness until you know how this medicine affects you. Do not stand or sit up quickly, especially if you are an older patient. This reduces the risk of dizzy or fainting spells. Alcohol may interfere with the effect of this medicine. Avoid alcoholic drinks.  Your mouth may get dry. Chewing sugarless gum or sucking hard candy, and drinking plenty of water may help. Contact your doctor if the problem does not go away or is severe.  What side effects may I notice from receiving this medicine?  Side effects that you should report to your doctor or health care professional as soon as possible:    allergic reactions like skin rash, itching or hives, swelling of the face, lips, or tongue    confusion    feeling faint or lightheaded, falls    fast talking and excited feelings or actions that are out of control    hallucination, loss of contact with reality    seizures    suicidal thoughts or other mood changes    unusual bleeding or bruising  Side effects that usually do not require medical attention (report to your doctor or health care professional if they continue or are bothersome):    blurred vision    changes in appetite    change in sex drive  or performance    headache    increased sweating    nausea  This list may not describe all possible side effects. Call your doctor for medical advice about side effects. You may report side effects to FDA at 0-047-QZA-2408.  Where should I keep my medicine?  Keep out of reach of children.  Store at room temperature between 15 and 30 degrees C (59 and 86 degrees F). Throw away any unused medicine after the expiration date.  NOTE:This sheet is a summary. It may not cover all possible information. If you have questions about this medicine, talk to your doctor, pharmacist, or health care provider. Copyright  2016 [employer]

## 2022-03-03 NOTE — PROGRESS NOTES
Lottie is a 31 year old who is being evaluated via a billable video visit.      How would you like to obtain your AVS? MyChart  If the video visit is dropped, the invitation should be resent by: Text to cell phone: [phone]  Will anyone else be joining your video visit? No      Video Start Time: 10:13 AM    Assessment & Plan     Adjustment disorder with mixed anxiety and depressed mood  Uncontrolled anxiety and less so depression symptoms.  Continues to see his therapist weekly.  Will start Escitalopram 10 mg daily. Discussed R/B/SE. Will start escitalopram 10mg daily and will follow up in two weeks virtually.  if tolerating the medication well, will increase to 20mg daily at that time. Advised remaining on medication for 6-12 months as early discontinuation can increase risk of rebound symptoms.  Admits to SI, denies plan or intent to harm himself.  Has crisis resources which were provided by his therapist.  He has been open with his wife about his SI as well and he  feels well supported.    - escitalopram (LEXAPRO) 10 MG tablet; Take 1 tablet (10 mg) by mouth daily       Patient Instructions   1) Start escitalopram 10mg daily and you can reduce the dose to 1/2 tablet (5mg) daily if you are having difficulty with upset stomach or significant anxiety initially.   2) Follow up with me in two weeks.       Patient Education     Patient Education    Escitalopram Oral solution    Escitalopram Oral tablet  Escitalopram Oral tablet  What is this medicine?  ESCITALOPRAM (es sye DAWNA oh pram) is used to treat depression and certain types of anxiety.  This medicine may be used for other purposes; ask your health care provider or pharmacist if you have questions.  What should I tell my health care provider before I take this medicine?  They need to know if you have any of these conditions:    bipolar disorder or a family history of bipolar disorder    diabetes    glaucoma    heart disease    kidney or liver  disease    receiving electroconvulsive therapy    seizures (convulsions)    suicidal thoughts, plans, or attempt by you or a family member    an unusual or allergic reaction to escitalopram, the related drug citalopram, other medicines, foods, dyes, or preservatives    pregnant or trying to become pregnant    breast-feeding  How should I use this medicine?  Take this medicine by mouth with a glass of water. Follow the directions on the prescription label. You can take it with or without food. If it upsets your stomach, take it with food. Take your medicine at regular intervals. Do not take it more often than directed. Do not stop taking this medicine suddenly except upon the advice of your doctor. Stopping this medicine too quickly may cause serious side effects or your condition may worsen.  A special MedGuide will be given to you by the pharmacist with each prescription and refill. Be sure to read this information carefully each time.  Talk to your pediatrician regarding the use of this medicine in children. Special care may be needed.  Overdosage: If you think you have taken too much of this medicine contact a poison control center or emergency room at once.  NOTE: This medicine is only for you. Do not share this medicine with others.  What if I miss a dose?  If you miss a dose, take it as soon as you can. If it is almost time for your next dose, take only that dose. Do not take double or extra doses.  What may interact with this medicine?  Do not take this medicine with any of the following medications:    certain medicines for fungal infections like fluconazole, itraconazole, ketoconazole, posaconazole, voriconazole    cisapride    citalopram    dofetilide    dronedarone    linezolid    MAOIs like Carbex, Eldepryl, Marplan, Nardil, and Parnate    methylene blue (injected into a vein)    pimozide    thioridazine    ziprasidone  This medicine may also interact with the following medications:    alcohol    aspirin  and aspirin-like medicines    carbamazepine    certain medicines for depression, anxiety, or psychotic disturbances    certain medicines for migraine headache like almotriptan, eletriptan, frovatriptan, naratriptan, rizatriptan, sumatriptan, zolmitriptan    certain medicines for sleep    certain medicines that treat or prevent blood clots like warfarin, enoxaparin, dalteparin    cimetidine    diuretics    fentanyl    furazolidone    isoniazid    lithium    metoprolol    NSAIDs, medicines for pain and inflammation, like ibuprofen or naproxen    other medicines that prolong the QT interval (cause an abnormal heart rhythm)    procarbazine    rasagiline    supplements like [place]'s wort, kava kava, valerian    tramadol    tryptophan  This list may not describe all possible interactions. Give your health care provider a list of all the medicines, herbs, non-prescription drugs, or dietary supplements you use. Also tell them if you smoke, drink alcohol, or use illegal drugs. Some items may interact with your medicine.  What should I watch for while using this medicine?  Tell your doctor if your symptoms do not get better or if they get worse. Visit your doctor or health care professional for regular checks on your progress. Because it may take several weeks to see the full effects of this medicine, it is important to continue your treatment as prescribed by your doctor.  Patients and their families should watch out for new or worsening thoughts of suicide or depression. Also watch out for sudden changes in feelings such as feeling anxious, agitated, panicky, irritable, hostile, aggressive, impulsive, severely restless, overly excited and hyperactive, or not being able to sleep. If this happens, especially at the beginning of treatment or after a change in dose, call your health care professional.  You may get drowsy or dizzy. Do not drive, use machinery, or do anything that needs mental alertness until you know how this  "medicine affects you. Do not stand or sit up quickly, especially if you are an older patient. This reduces the risk of dizzy or fainting spells. Alcohol may interfere with the effect of this medicine. Avoid alcoholic drinks.  Your mouth may get dry. Chewing sugarless gum or sucking hard candy, and drinking plenty of water may help. Contact your doctor if the problem does not go away or is severe.  What side effects may I notice from receiving this medicine?  Side effects that you should report to your doctor or health care professional as soon as possible:    allergic reactions like skin rash, itching or hives, swelling of the face, lips, or tongue    confusion    feeling faint or lightheaded, falls    fast talking and excited feelings or actions that are out of control    hallucination, loss of contact with reality    seizures    suicidal thoughts or other mood changes    unusual bleeding or bruising  Side effects that usually do not require medical attention (report to your doctor or health care professional if they continue or are bothersome):    blurred vision    changes in appetite    change in sex drive or performance    headache    increased sweating    nausea  This list may not describe all possible side effects. Call your doctor for medical advice about side effects. You may report side effects to FDA at 3-806-RZD-3762.  Where should I keep my medicine?  Keep out of reach of children.  Store at room temperature between 15 and 30 degrees C (59 and 86 degrees F). Throw away any unused medicine after the expiration date.  NOTE:This sheet is a summary. It may not cover all possible information. If you have questions about this medicine, talk to your doctor, pharmacist, or health care provider. Copyright  2016 [employer] Standard                 BMI:   Estimated body mass index is 25.74 kg/m  as calculated from the following:    Height as of 5/5/21: 1.727 m (5' 8\").    Weight as of 8/24/21: 76.8 kg (169 lb 4.8 oz).    "     Depression Screening Follow Up    PHQ 3/3/2022   PHQ-9 Total Score 13   Q9: Thoughts of better off dead/self-harm past 2 weeks Several days   F/U: Thoughts of suicide or self-harm No   F/U: Safety concerns No          Return in about 2 weeks (around 3/17/2022) for Follow up, using a video visit.    Camlia Calderon MD   [facility] THALIA Tafoya is a 31 year old who presents for the following health issues     History of Present Illness       Mental Health Follow-up:  Patient presents to follow-up on Depression & Anxiety.Patient's depression since last visit has been:  No change  The patient is not having other symptoms associated with depression.  Patient's anxiety since last visit has been:  No change  The patient is not having other symptoms associated with anxiety.  Any significant life events: relationship concerns, job concerns, housing concerns and grief or loss  Patient is feeling anxious or having panic attacks.  Patient has no concerns about alcohol or drug use.     Social History  Tobacco Use    Smoking status: Never Smoker    Smokeless tobacco: Never Used  Alcohol use: Yes    Comment: social  Drug use: Never      Today's PHQ-9         PHQ-9 Total Score:     (P) 13   PHQ-9 Q9 Thoughts of better off dead/self-harm past 2 weeks :   (P) Several days   Thoughts of suicide or self harm:  (P) No   Self-harm Plan:        Self-harm Action:          Safety concerns for self or others: (P) No         He eats 2-3 servings of fruits and vegetables daily.He consumes 2 sweetened beverage(s) daily.He exercises with enough effort to increase his heart rate 30 to 60 minutes per day.  He exercises with enough effort to increase his heart rate 5 days per week.   He is taking medications regularly.            LIDIA-7 SCORE 3/3/2022   Total Score 13 (moderate anxiety)   Total Score 13     PHQ 3/3/2022   PHQ-9 Total Score 13   Q9: Thoughts of better off dead/self-harm past 2 weeks Several days    F/U: Thoughts of suicide or self-harm No   F/U: Safety concerns No       He has had primary anxiety symptoms as well impression brief symptoms for the past year.  About year ago, his brother  of an accidental overdose.  His brother had been taking benzos for his anxiety which had increased because of the Covid pandemic -- his brother was a trauma nurse.  His brother obtained Xanax off the street and it was laced with fentanyl.     Lottie has also experienced an increase in stress at home as he has a new baby -- joyful and stressful.    She has opened up to his wife recently about his suicidal thoughts.    He has been seeing his therapist weekly, which has been helpful.  He has had an increase in suicidal thoughts over the past few months and his therapist recommended discussing medication with his primary.  He has never been on medication in the past for his mood.  He does have family members who have had depression or anxiety, but does not know what medications they have taken.      Social History     Tobacco Use     Smoking status: Never Smoker     Smokeless tobacco: Never Used   Substance Use Topics     Alcohol use: Yes     Comment: social     Drug use: Never       Review of Systems          Objective           Vitals:  No vitals were obtained today due to virtual visit.    Physical Exam   GENERAL: Healthy, alert and no distress  EYES: Eyes grossly normal to inspection.  No discharge or erythema, or obvious scleral/conjunctival abnormalities.  RESP: No audible wheeze, cough, or visible cyanosis.  No visible retractions or increased work of breathing.    SKIN: Visible skin clear. No significant rash, abnormal pigmentation or lesions.  NEURO: Cranial nerves grossly intact.  Mentation and speech appropriate for age.  PSYCH: Mentation appears normal, affect normal/bright, judgement and insight intact, normal speech and appearance well-groomed.                  Video-Visit Details    Type of service:  Video  Visit    Video End Time:10:37 AM    Originating Location (pt. Location): Home    Distant Location (provider location):  [facility]     Platform used for Video Visit: Marylin  Answers for HPI/ROS submitted by the patient on 3/3/2022  PHQ9 TOTAL SCORE: 13  LIDIA 7 TOTAL SCORE: 13

## 2022-03-04 ASSESSMENT — PATIENT HEALTH QUESTIONNAIRE - PHQ9: SUM OF ALL RESPONSES TO PHQ QUESTIONS 1-9: 13

## 2022-03-04 ASSESSMENT — ANXIETY QUESTIONNAIRES: GAD7 TOTAL SCORE: 13

## 2022-03-17 ENCOUNTER — VIRTUAL VISIT (OUTPATIENT)
Dept: FAMILY MEDICINE | Facility: CLINIC | Age: 32
End: 2022-03-17
Payer: COMMERCIAL

## 2022-03-17 DIAGNOSIS — Z11.4 SCREENING FOR HIV (HUMAN IMMUNODEFICIENCY VIRUS): ICD-10-CM

## 2022-03-17 DIAGNOSIS — Z11.59 NEED FOR HEPATITIS C SCREENING TEST: ICD-10-CM

## 2022-03-17 DIAGNOSIS — F43.23 ADJUSTMENT DISORDER WITH MIXED ANXIETY AND DEPRESSED MOOD: Primary | ICD-10-CM

## 2022-03-17 PROCEDURE — 99214 OFFICE O/P EST MOD 30 MIN: CPT | Mod: GT | Performed by: FAMILY MEDICINE

## 2022-03-17 RX ORDER — ESCITALOPRAM OXALATE 20 MG/1
20 TABLET ORAL DAILY
Qty: 30 TABLET | Refills: 1 | Status: SHIPPED | OUTPATIENT
Start: 2022-03-17 | End: 2022-04-21

## 2022-03-17 ASSESSMENT — ANXIETY QUESTIONNAIRES
6. BECOMING EASILY ANNOYED OR IRRITABLE: SEVERAL DAYS
GAD7 TOTAL SCORE: 7
4. TROUBLE RELAXING: SEVERAL DAYS
7. FEELING AFRAID AS IF SOMETHING AWFUL MIGHT HAPPEN: SEVERAL DAYS
GAD7 TOTAL SCORE: 7
7. FEELING AFRAID AS IF SOMETHING AWFUL MIGHT HAPPEN: SEVERAL DAYS
1. FEELING NERVOUS, ANXIOUS, OR ON EDGE: SEVERAL DAYS
5. BEING SO RESTLESS THAT IT IS HARD TO SIT STILL: NOT AT ALL
GAD7 TOTAL SCORE: 7
3. WORRYING TOO MUCH ABOUT DIFFERENT THINGS: SEVERAL DAYS
2. NOT BEING ABLE TO STOP OR CONTROL WORRYING: MORE THAN HALF THE DAYS

## 2022-03-17 ASSESSMENT — PATIENT HEALTH QUESTIONNAIRE - PHQ9
10. IF YOU CHECKED OFF ANY PROBLEMS, HOW DIFFICULT HAVE THESE PROBLEMS MADE IT FOR YOU TO DO YOUR WORK, TAKE CARE OF THINGS AT HOME, OR GET ALONG WITH OTHER PEOPLE: SOMEWHAT DIFFICULT
SUM OF ALL RESPONSES TO PHQ QUESTIONS 1-9: 10
SUM OF ALL RESPONSES TO PHQ QUESTIONS 1-9: 10

## 2022-03-17 NOTE — PROGRESS NOTES
"Lottie is a 31 year old who is being evaluated via a billable video visit.      How would you like to obtain your AVS? EventMamahart  If the video visit is dropped, the invitation should be resent by: Text to cell phone: [phone]  Will anyone else be joining your video visit? No     Video Start Time: 10:35 AM    Assessment & Plan        Adjustment disorder with mixed anxiety and depressed mood  Improving. Initially noticed some side effects with the Escitalopram including loss of appetite and some upset stomach as well as increased anxiety, but those have resolved and he is noticing improvement in his mood.  Will increase Escitalopram to 20 mg daily and plan on follow-up in a month.  He will let me know in the meantime if any concerns arise.    Uncontrolled anxiety and less so depression symptoms.  Continues to see his therapist weekly.  Will start Escitalopram 10 mg daily. Discussed R/B/SE. Will start escitalopram 10mg daily and will follow up in two weeks virtually.  if tolerating the medication well, will increase to 20mg daily at that time. Advised remaining on medication for 6-12 months as early discontinuation can increase risk of rebound symptoms.  Admits to SI, denies plan or intent to harm himself.  Has crisis resources which were provided by his therapist.  He has been open with his wife about his SI as well and he  feels well supported.     - escitalopram (LEXAPRO) 10 MG tablet; Take 1 tablet (10 mg) by mouth daily          BMI:   Estimated body mass index is 25.74 kg/m  as calculated from the following:    Height as of 5/5/21: 1.727 m (5' 8\").    Weight as of 8/24/21: 76.8 kg (169 lb 4.8 oz).        Patient Instructions   1.  Increase Escitalopram to 20 mg daily  2.  Schedule a video follow-up with me in a month  3.  Reach out by  [employer] anytime if any concerns come up.  4.  Schedule a preventive visit sometime this summer      Return in about 4 weeks (around 4/14/2022) for Follow up, using a video " visit.    Camila Calderon MD   [facility] THALIA Tafoya is a 31 year old who presents for the following health issues      History of Present Illness       Mental Health Follow-up:  Patient presents to follow-up on Depression & Anxiety.Patient's depression since last visit has been:  Better  The patient is having other symptoms associated with depression.  Patient's anxiety since last visit has been:  Better  The patient is having other symptoms associated with anxiety.  Any significant life events: relationship concerns  Patient is not feeling anxious or having panic attacks.  Patient has no concerns about alcohol or drug use.       Today's PHQ-9         PHQ-9 Total Score: 10  PHQ-9 Q9 Thoughts of better off dead/self-harm past 2 weeks :   (P) Not at all    How difficult have these problems made it for you to do your work, take care of things at home, or get along with other people: Somewhat difficult    Today's LIDIA-7 Score: 7      PHQ 3/3/2022 3/17/2022   PHQ-9 Total Score 13 10   Q9: Thoughts of better off dead/self-harm past 2 weeks Several days Not at all   F/U: Thoughts of suicide or self-harm No -   F/U: Safety concerns No -     LIDIA-7 SCORE 3/3/2022 3/17/2022   Total Score 13 (moderate anxiety) 7 (mild anxiety)   Total Score 13 7               Review of Systems          Objective           Vitals:  No vitals were obtained today due to virtual visit.    Physical Exam   GENERAL: Healthy, alert and no distress  EYES: Eyes grossly normal to inspection.  No discharge or erythema, or obvious scleral/conjunctival abnormalities.  RESP: No audible wheeze, cough, or visible cyanosis.  No visible retractions or increased work of breathing.    SKIN: Visible skin clear. No significant rash, abnormal pigmentation or lesions.  NEURO: Cranial nerves grossly intact.  Mentation and speech appropriate for age.  PSYCH: Mentation appears normal, affect normal/bright, judgement and insight intact,  normal speech and appearance well-groomed.                  Video-Visit Details    Type of service:  Video Visit    Video End Time:10:40 AM    Originating Location (pt. Location): Home    Distant Location (provider location):  [facility]     Platform used for Video Visit: Phoneplus

## 2022-03-17 NOTE — PATIENT INSTRUCTIONS
1.  Increase Escitalopram to 20 mg daily  2.  Schedule a video follow-up with me in a month  3.  [facility] if any concerns come up.  4.  Schedule a preventive visit sometime this summer

## 2022-03-18 ASSESSMENT — PATIENT HEALTH QUESTIONNAIRE - PHQ9: SUM OF ALL RESPONSES TO PHQ QUESTIONS 1-9: 10

## 2022-03-18 ASSESSMENT — ANXIETY QUESTIONNAIRES: GAD7 TOTAL SCORE: 7

## 2022-04-21 ENCOUNTER — VIRTUAL VISIT (OUTPATIENT)
Dept: FAMILY MEDICINE | Facility: CLINIC | Age: 32
End: 2022-04-21
Payer: COMMERCIAL

## 2022-04-21 DIAGNOSIS — F43.23 ADJUSTMENT DISORDER WITH MIXED ANXIETY AND DEPRESSED MOOD: ICD-10-CM

## 2022-04-21 PROBLEM — Z83.438 FAMILY HISTORY OF HYPERLIPIDEMIA: Status: ACTIVE | Noted: 2018-05-09

## 2022-04-21 PROBLEM — Z80.8 FAMILY HISTORY OF SKIN CANCER: Status: ACTIVE | Noted: 2018-05-09

## 2022-04-21 PROCEDURE — 99213 OFFICE O/P EST LOW 20 MIN: CPT | Mod: GT | Performed by: FAMILY MEDICINE

## 2022-04-21 RX ORDER — ESCITALOPRAM OXALATE 20 MG/1
20 TABLET ORAL DAILY
Qty: 90 TABLET | Refills: 1 | Status: SHIPPED | OUTPATIENT
Start: 2022-04-21 | End: 2022-09-28

## 2022-04-21 ASSESSMENT — ANXIETY QUESTIONNAIRES
GAD7 TOTAL SCORE: 4
3. WORRYING TOO MUCH ABOUT DIFFERENT THINGS: SEVERAL DAYS
2. NOT BEING ABLE TO STOP OR CONTROL WORRYING: NOT AT ALL
6. BECOMING EASILY ANNOYED OR IRRITABLE: SEVERAL DAYS
7. FEELING AFRAID AS IF SOMETHING AWFUL MIGHT HAPPEN: NOT AT ALL
4. TROUBLE RELAXING: SEVERAL DAYS
GAD7 TOTAL SCORE: 4
5. BEING SO RESTLESS THAT IT IS HARD TO SIT STILL: NOT AT ALL
GAD7 TOTAL SCORE: 4
1. FEELING NERVOUS, ANXIOUS, OR ON EDGE: SEVERAL DAYS
7. FEELING AFRAID AS IF SOMETHING AWFUL MIGHT HAPPEN: NOT AT ALL

## 2022-04-21 ASSESSMENT — PATIENT HEALTH QUESTIONNAIRE - PHQ9
SUM OF ALL RESPONSES TO PHQ QUESTIONS 1-9: 4
10. IF YOU CHECKED OFF ANY PROBLEMS, HOW DIFFICULT HAVE THESE PROBLEMS MADE IT FOR YOU TO DO YOUR WORK, TAKE CARE OF THINGS AT HOME, OR GET ALONG WITH OTHER PEOPLE: NOT DIFFICULT AT ALL
SUM OF ALL RESPONSES TO PHQ QUESTIONS 1-9: 4

## 2022-04-21 NOTE — PROGRESS NOTES
"Lottie is a 31 year old who is being evaluated via a billable video visit.      How would you like to obtain your AVS? MyChart  If the video visit is dropped, the invitation should be resent by: Send to e-mail at: [email or web address]  Will anyone else be joining your video visit? No      Video Start Time: 10:33 AM    Assessment & Plan        Adjustment disorder with mixed anxiety and depressed mood  Improving.  He is tolerating escitalopram 20 mg daily well.  We will continue this dose.  I recommend scheduling a preventive visit in 4 to 6 months and we can check in on his symptoms at that time.  He will reach out anytime if he needs additional help.     Initially noticed some side effects with the Escitalopram including loss of appetite and some upset stomach as well as increased anxiety, but those have resolved and he is noticing improvement in his mood.      Uncontrolled anxiety and less so depression symptoms.  Continues to see his therapist weekly.  Will start Escitalopram 10 mg daily. Discussed R/B/SE. Will start escitalopram 10mg daily and will follow up in two weeks virtually.  if tolerating the medication well, will increase to 20mg daily at that time. Advised remaining on medication for 6-12 months as early discontinuation can increase risk of rebound symptoms.  Admits to SI, denies plan or intent to harm himself.  Has crisis resources which were provided by his therapist.  He has been open with his wife about his SI as well and he  feels well supported.     - escitalopram (LEXAPRO) 10 MG tablet; Take 1 tablet (10 mg) by mouth daily              }     BMI:   Estimated body mass index is 25.74 kg/m  as calculated from the following:    Height as of 5/5/21: 1.727 m (5' 8\").    Weight as of 8/24/21: 76.8 kg (169 lb 4.8 oz).           No follow-ups on file.    Camila Caledron MD   [facility]    Subjective   Lottie is a 31 year old who presents for the following health issues     History of " Present Illness       Mental Health Follow-up:  Patient presents to follow-up on Depression & Anxiety.Patient's depression since last visit has been:  Good  The patient is not having other symptoms associated with depression.  Patient's anxiety since last visit has been:  Good  The patient is not having other symptoms associated with anxiety.  Any significant life events: relationship concerns and job concerns  Patient is not feeling anxious or having panic attacks.  Patient has no concerns about alcohol or drug use.       Today's PHQ-9         PHQ-9 Total Score: 4  PHQ-9 Q9 Thoughts of better off dead/self-harm past 2 weeks :   (P) Not at all    How difficult have these problems made it for you to do your work, take care of things at home, or get along with other people: Not difficult at all    Today's LIDIA-7 Score: 4      LIDIA-7 SCORE 3/3/2022 3/17/2022 4/21/2022   Total Score 13 (moderate anxiety) 7 (mild anxiety) 4 (minimal anxiety)   Total Score 13 7 4       PHQ 3/3/2022 3/17/2022 4/21/2022   PHQ-9 Total Score 13 10 4   Q9: Thoughts of better off dead/self-harm past 2 weeks Several days Not at all Not at all   F/U: Thoughts of suicide or self-harm No - -   F/U: Safety concerns No - -          Review of Systems          Objective           Vitals:  No vitals were obtained today due to virtual visit.    Physical Exam   GENERAL: Healthy, alert and no distress  EYES: Eyes grossly normal to inspection.  No discharge or erythema, or obvious scleral/conjunctival abnormalities.  RESP: No audible wheeze, cough, or visible cyanosis.  No visible retractions or increased work of breathing.    SKIN: Visible skin clear. No significant rash, abnormal pigmentation or lesions.  NEURO: Cranial nerves grossly intact.  Mentation and speech appropriate for age.  PSYCH: Mentation appears normal, affect normal/bright, judgement and insight intact, normal speech and appearance well-groomed.                  Video-Visit Details    Type  of service:  Video Visit    Video End Time:10:37 AM    Originating Location (pt. Location): Home    Distant Location (provider location):  [facility]     Platform used for Video Visit: Perfect [employer]

## 2022-04-22 ASSESSMENT — PATIENT HEALTH QUESTIONNAIRE - PHQ9: SUM OF ALL RESPONSES TO PHQ QUESTIONS 1-9: 4

## 2022-04-22 ASSESSMENT — ANXIETY QUESTIONNAIRES: GAD7 TOTAL SCORE: 4

## 2022-05-21 ENCOUNTER — HEALTH MAINTENANCE LETTER (OUTPATIENT)
Age: 32
End: 2022-05-21

## 2022-06-24 ENCOUNTER — OFFICE VISIT (OUTPATIENT)
Dept: URGENT CARE | Facility: URGENT CARE | Age: 32
End: 2022-06-24
Payer: COMMERCIAL

## 2022-06-24 VITALS
DIASTOLIC BLOOD PRESSURE: 72 MMHG | SYSTOLIC BLOOD PRESSURE: 115 MMHG | TEMPERATURE: 98.2 F | HEART RATE: 81 BPM | OXYGEN SATURATION: 97 %

## 2022-06-24 DIAGNOSIS — S61.011A LACERATION OF RIGHT THUMB WITHOUT FOREIGN BODY, NAIL DAMAGE STATUS UNSPECIFIED, INITIAL ENCOUNTER: Primary | ICD-10-CM

## 2022-06-24 PROCEDURE — 12001 RPR S/N/AX/GEN/TRNK 2.5CM/<: CPT | Performed by: PHYSICIAN ASSISTANT

## 2022-06-24 NOTE — PROGRESS NOTES
SUBJECTIVE:     Chief Complaint   Patient presents with     Urgent Care     Laceration     Right thumb laceration-happened around 2pm today-Tetanus 06/26/2019      Stas Hollis is a 31 year old male who presents to the clinic with a laceration on the right thumb sustained 1 day(s) ago.  This is a accidental injury.    Mechanism of injury: knife.    Associated symptoms: Denies loss of consciousness, vomiting or confusion.  Denies numbness, weakness, or loss of function  Last tetanus booster within 10 years: yes    EXAM:   The patient appears today in alert,no apparent distress distress  VITALS: /72   Pulse 81   Temp 98.2  F (36.8  C) (Temporal)   SpO2 97%     Size of laceration: 1 centimeters  Characteristics of the laceration: NO active bleeding, clean, straight and superficial  Tendon function intact: yes  Sensation to light touch intact: yes  Pulses intact: yes  Picture included in patient's chart: no    Assessment:  (S61.011A) Laceration of right thumb without foreign body, nail damage status unspecified, initial encounter  (primary encounter diagnosis)  Plan: REPAIR SUPERFICIAL, WOUND BODY < =2.5CM    PLAN:  PROCEDURE NOTE::  Prepped and draped in the usual sterile fashion  Wound cleaned with saline  Wound soaked  Wound irrigated  Dermabond was applied  After care instructions:  Keep wound clean and dry for the next 24-48 hours  Signs of infection discussed today  May return to work as long as wound is kept clean and dry  Discussed the probability of scarring

## 2022-08-23 ENCOUNTER — TRANSFERRED RECORDS (OUTPATIENT)
Dept: FAMILY MEDICINE | Facility: CLINIC | Age: 32
End: 2022-08-23

## 2022-09-12 ENCOUNTER — NURSE TRIAGE (OUTPATIENT)
Dept: NURSING | Facility: CLINIC | Age: 32
End: 2022-09-12

## 2022-09-12 NOTE — TELEPHONE ENCOUNTER
Covid screening completed    Patient calling to confirm his appointment he scheduled on [facility].    Miracle Pike RN  [facility] Nurse Advisor    Reason for Disposition    [1] Other NON-URGENT information for PCP AND [2] does not require PCP response    Protocols used: PCP CALL - NO TRIAGE-A-

## 2022-09-15 ENCOUNTER — E-VISIT (OUTPATIENT)
Dept: URGENT CARE | Facility: CLINIC | Age: 32
End: 2022-09-15
Payer: COMMERCIAL

## 2022-09-15 DIAGNOSIS — R21 RASH AND NONSPECIFIC SKIN ERUPTION: Primary | ICD-10-CM

## 2022-09-15 PROCEDURE — 99207 PR NON-BILLABLE SERV PER CHARTING: CPT | Performed by: FAMILY MEDICINE

## 2022-09-18 ENCOUNTER — HEALTH MAINTENANCE LETTER (OUTPATIENT)
Age: 32
End: 2022-09-18

## 2022-09-21 ASSESSMENT — ENCOUNTER SYMPTOMS
HEMATURIA: 0
COUGH: 0
EYE PAIN: 0
HEADACHES: 0
JOINT SWELLING: 0
DIARRHEA: 0
HEARTBURN: 0
HEMATOCHEZIA: 0
ABDOMINAL PAIN: 0
DYSURIA: 0
DIZZINESS: 0
CONSTIPATION: 0
PALPITATIONS: 0
CHILLS: 0
ARTHRALGIAS: 0
MYALGIAS: 0
SORE THROAT: 0
FREQUENCY: 0
NERVOUS/ANXIOUS: 0
WEAKNESS: 0
FEVER: 0
SHORTNESS OF BREATH: 0
NAUSEA: 0
PARESTHESIAS: 0

## 2022-09-28 ENCOUNTER — OFFICE VISIT (OUTPATIENT)
Dept: FAMILY MEDICINE | Facility: CLINIC | Age: 32
End: 2022-09-28
Payer: COMMERCIAL

## 2022-09-28 VITALS
TEMPERATURE: 97.2 F | HEART RATE: 88 BPM | SYSTOLIC BLOOD PRESSURE: 119 MMHG | WEIGHT: 207 LBS | RESPIRATION RATE: 14 BRPM | BODY MASS INDEX: 31.37 KG/M2 | DIASTOLIC BLOOD PRESSURE: 81 MMHG | HEIGHT: 68 IN | OXYGEN SATURATION: 96 %

## 2022-09-28 DIAGNOSIS — Z13.1 SCREENING FOR DIABETES MELLITUS: ICD-10-CM

## 2022-09-28 DIAGNOSIS — Z11.59 NEED FOR HEPATITIS C SCREENING TEST: ICD-10-CM

## 2022-09-28 DIAGNOSIS — Z00.00 ROUTINE GENERAL MEDICAL EXAMINATION AT A HEALTH CARE FACILITY: Primary | ICD-10-CM

## 2022-09-28 DIAGNOSIS — F43.23 ADJUSTMENT DISORDER WITH MIXED ANXIETY AND DEPRESSED MOOD: ICD-10-CM

## 2022-09-28 DIAGNOSIS — Z11.4 SCREENING FOR HIV (HUMAN IMMUNODEFICIENCY VIRUS): ICD-10-CM

## 2022-09-28 DIAGNOSIS — Z13.220 LIPID SCREENING: ICD-10-CM

## 2022-09-28 PROCEDURE — 36415 COLL VENOUS BLD VENIPUNCTURE: CPT | Performed by: FAMILY MEDICINE

## 2022-09-28 PROCEDURE — 86803 HEPATITIS C AB TEST: CPT | Performed by: FAMILY MEDICINE

## 2022-09-28 PROCEDURE — 80061 LIPID PANEL: CPT | Performed by: FAMILY MEDICINE

## 2022-09-28 PROCEDURE — 99395 PREV VISIT EST AGE 18-39: CPT | Performed by: FAMILY MEDICINE

## 2022-09-28 PROCEDURE — 87389 HIV-1 AG W/HIV-1&-2 AB AG IA: CPT | Performed by: FAMILY MEDICINE

## 2022-09-28 PROCEDURE — 82947 ASSAY GLUCOSE BLOOD QUANT: CPT | Performed by: FAMILY MEDICINE

## 2022-09-28 RX ORDER — ESCITALOPRAM OXALATE 20 MG/1
20 TABLET ORAL DAILY
Qty: 90 TABLET | Refills: 1 | Status: SHIPPED | OUTPATIENT
Start: 2022-09-28 | End: 2023-05-03

## 2022-09-28 ASSESSMENT — ENCOUNTER SYMPTOMS
NAUSEA: 0
ABDOMINAL PAIN: 0
DIARRHEA: 0
HEMATOCHEZIA: 0
CONSTIPATION: 0
SHORTNESS OF BREATH: 0
EYE PAIN: 0
COUGH: 0
FEVER: 0
HEADACHES: 0
PARESTHESIAS: 0
DIZZINESS: 0
FREQUENCY: 0
MYALGIAS: 0
HEARTBURN: 0
CHILLS: 0
WEAKNESS: 0
SORE THROAT: 0
PALPITATIONS: 0
DYSURIA: 0
NERVOUS/ANXIOUS: 0
HEMATURIA: 0
JOINT SWELLING: 0
ARTHRALGIAS: 0

## 2022-09-28 NOTE — PROGRESS NOTES
SUBJECTIVE:   CC: Lottie is an 31 year old who presents for preventative health visit.       Patient has been advised of split billing requirements and indicates understanding: Yes  Healthy Habits:     Getting at least 3 servings of Calcium per day:  Yes    Bi-annual eye exam:  Yes    Dental care twice a year:  Yes    Sleep apnea or symptoms of sleep apnea:  None    Diet:  Regular (no restrictions)    Frequency of exercise:  2-3 days/week    Duration of exercise:  15-30 minutes    Taking medications regularly:  Yes    Medication side effects:  None    PHQ-2 Total Score: 0    Additional concerns today:  No      Today's PHQ-2 Score:   PHQ-2 ( 1999 [employer]) 9/21/2022   Q1: Little interest or pleasure in doing things 0   Q2: Feeling down, depressed or hopeless 0   PHQ-2 Score 0   PHQ-2 Total Score (12-17 Years)- Positive if 3 or more points; Administer PHQ-A if positive -   Q1: Little interest or pleasure in doing things Not at all   Q2: Feeling down, depressed or hopeless Not at all   PHQ-2 Score 0       Abuse: Current or Past(Physical, Sexual or Emotional)- No  Do you feel safe in your environment? Yes    Have you ever done Advance Care Planning? (For example, a Health Directive, POLST, or a discussion with a medical provider or your loved ones about your wishes): Yes, patient states has an Advance Care Planning document and will bring a copy to the clinic.    Social History     Tobacco Use     Smoking status: Never Smoker     Smokeless tobacco: Never Used   Substance Use Topics     Alcohol use: Yes     Comment: weekly social drinking     If you drink alcohol do you typically have >3 drinks per day or >7 drinks per week? No    Alcohol Use 9/21/2022   Prescreen: >3 drinks/day or >7 drinks/week? No       Last PSA: No results found for: PSA    Reviewed orders with patient. Reviewed health maintenance and updated orders accordingly - Yes       Reviewed and updated as needed this visit by clinical staff   Tobacco  Allergies   "Meds   Med Hx  Surg Hx  Fam Hx  Soc Hx          Reviewed and updated as needed this visit by Provider                        Review of Systems   Constitutional: Negative for chills and fever.   HENT: Negative for congestion, ear pain, hearing loss and sore throat.    Eyes: Negative for pain and visual disturbance.   Respiratory: Negative for cough and shortness of breath.    Cardiovascular: Negative for chest pain, palpitations and peripheral edema.   Gastrointestinal: Negative for abdominal pain, constipation, diarrhea, heartburn, hematochezia and nausea.   Genitourinary: Negative for dysuria, frequency, genital sores, hematuria, impotence, penile discharge and urgency.   Musculoskeletal: Negative for arthralgias, joint swelling and myalgias.   Skin: Negative for rash.   Neurological: Negative for dizziness, weakness, headaches and paresthesias.   Psychiatric/Behavioral: Negative for mood changes. The patient is not nervous/anxious.           OBJECTIVE:   /81 (BP Location: Right arm, Patient Position: Chair, Cuff Size: Adult Regular)   Pulse 88   Temp 97.2  F (36.2  C) (Temporal)   Resp 14   Ht 1.715 m (5' 7.5\")   Wt 93.9 kg (207 lb)   SpO2 96%   BMI 31.94 kg/m    Wt Readings from Last 5 Encounters:   09/28/22 93.9 kg (207 lb)   08/24/21 76.8 kg (169 lb 4.8 oz)   05/05/21 83.9 kg (185 lb)   07/12/19 80.4 kg (177 lb 3.2 oz)   06/27/19 78.5 kg (173 lb)      Physical Exam  GENERAL: healthy, alert and no distress  EYES: Eyes grossly normal to inspection, PERRL and conjunctivae and sclerae normal  HENT: ear canals and TM's normal, nose and mouth without ulcers or lesions  NECK: no adenopathy, no asymmetry, masses, or scars and thyroid normal to palpation  RESP: lungs clear to auscultation - no rales, rhonchi or wheezes  CV: regular rate and rhythm, normal S1 S2, no S3 or S4, no murmur, click or rub, no peripheral edema and peripheral pulses strong  ABDOMEN: soft, nontender, no hepatosplenomegaly, no " masses and bowel sounds normal  MS: no gross musculoskeletal defects noted, no edema  SKIN: no suspicious lesions or rashes  NEURO: Normal strength and tone, mentation intact and speech normal  PSYCH: mentation appears normal, affect normal/bright    Diagnostic Test Results:  Labs reviewed in Epic    ASSESSMENT/PLAN:       ICD-10-CM    1. Routine general medical examination at a health care facility  Z00.00    2. Adjustment disorder with mixed anxiety and depressed mood  F43.23    3. Screening for HIV (human immunodeficiency virus)  Z11.4    4. Need for hepatitis C screening test  Z11.59      Adjustment disorder with mixed anxiety and depressed mood  Improving.  He is tolerating escitalopram 20 mg daily well.  We will continue this dose.          Initially noticed some side effects with the Escitalopram including loss of appetite and some upset stomach as well as increased anxiety, but those have resolved and he is noticing improvement in his mood.      Uncontrolled anxiety and less so depression symptoms.  Continues to see his therapist weekly.  Will start Escitalopram 10 mg daily. Discussed R/B/SE. Will start escitalopram 10mg daily and will follow up in two weeks virtually.  if tolerating the medication well, will increase to 20mg daily at that time. Advised remaining on medication for 6-12 months as early discontinuation can increase risk of rebound symptoms.  Admits to SI, denies plan or intent to harm himself.  Has crisis resources which were provided by his therapist.  He has been open with his wife about his SI as well and he  feels well supported.     - escitalopram (LEXAPRO) 10 MG tablet; Take 1 tablet (10 mg) by mouth daily           Skin lesion  Family history of skin cancer  Grandfather with melanoma history and sister with history of precancerous skin lesion. Last dermatology appointment in 2021 and was told to return in about two years.            COUNSELING:   Reviewed preventive health counseling,  "as reflected in patient instructions    Estimated body mass index is 31.94 kg/m  as calculated from the following:    Height as of this encounter: 1.715 m (5' 7.5\").    Weight as of this encounter: 93.9 kg (207 lb).     Weight management plan: diet and exercise    He reports that he has never smoked. He has never used smokeless tobacco.      Counseling Resources:  ATP IV Guidelines  Pooled Cohorts Equation Calculator  FRAX Risk Assessment  ICSI Preventive Guidelines  Dietary Guidelines for Americans, 2010  USDA's MyPlate  ASA Prophylaxis  Lung CA Screening    Camila Calderon MD  [facility]  "

## 2022-09-28 NOTE — PATIENT INSTRUCTIONS
"I recommend reducing sugar and flour in your diet, eat two to three meals per day and avoid snacking between meals, consider reading \"The Obesity Code\" by Adal Gaytan MD.      Preventive Health Recommendations  Male Ages 26 - 39    Yearly exam:             See your health care provider every year in order to  o   Review health changes.   o   Discuss preventive care.    o   Review your medicines if your doctor has prescribed any.  You should be tested each year for STDs (sexually transmitted diseases), if you re at risk.   After age 35, talk to your provider about cholesterol testing. If you are at risk for heart disease, have your cholesterol tested at least every 5 years.   If you are at risk for diabetes, you should have a diabetes test (fasting glucose).  Shots: Get a flu shot each year. Get a tetanus shot every 10 years.     Nutrition:  Eat at least 5 servings of fruits and vegetables daily.   Eat whole-grain bread, whole-wheat pasta and brown rice instead of white grains and rice.   Get adequate Calcium and Vitamin D.     Lifestyle  Exercise for at least 150 minutes a week (30 minutes a day, 5 days a week). This will help you control your weight and prevent disease.   Limit alcohol to one drink per day.   No smoking.   Wear sunscreen to prevent skin cancer.   See your dentist every six months for an exam and cleaning.     "

## 2022-09-29 LAB
CHOLEST SERPL-MCNC: 196 MG/DL
FASTING STATUS PATIENT QL REPORTED: YES
FASTING STATUS PATIENT QL REPORTED: YES
GLUCOSE BLD-MCNC: 88 MG/DL (ref 70–99)
HCV AB SERPL QL IA: NONREACTIVE
HDLC SERPL-MCNC: 36 MG/DL
HIV 1+2 AB+HIV1 P24 AG SERPL QL IA: NONREACTIVE
LDLC SERPL CALC-MCNC: 103 MG/DL
NONHDLC SERPL-MCNC: 160 MG/DL
TRIGL SERPL-MCNC: 286 MG/DL

## 2022-09-29 NOTE — RESULT ENCOUNTER NOTE
The results of your recent lipid (cholesterol) profile were abnormal.    Here are the results:  Lab Results       Component                Value               Date                       CHOL                     196                 09/28/2022                 CHOL                     158                 05/05/2021            Lab Results       Component                Value               Date                       HDL                      36                  09/28/2022                 HDL                      53                  05/05/2021            Lab Results       Component                Value               Date                       LDL                      103                 09/28/2022                 LDL                      90                  05/05/2021            Lab Results       Component                Value               Date                       TRIG                     286                 09/28/2022                 TRIG                     75                  05/05/2021            No results found for: CHOLHDLRATIO    Desired or goal levels are:  CHOLESTEROL: Desirable is less than 200.   HDL (Good Cholesterol): Desirable is greater than 40 (for men) greater than 50 (for women).  LDL (Bad Cholesterol): Desirable is less than 130 (or less than 100 if you have heart disease or diabetes). Borderline 130-160.  TRIGLYCERIDES: Desirable is less than 150.  Borderline is 150-200.    For high triglycerides, reduce the intake of jam, jelly, honey, alcohol, and/or coffee creamers  Your HDL (the good cholesterol) level is low, no medication is required at this point. Reducing your total fat intake, increasing exercise level, reducing weight, adding olive oil to your diet, etc, may help to increase this level..    As you may know, an elevated cholesterol is one factor that increases your risk for heart disease and stroke. You can improve your cholesterol by controlling the amount and type of fat you eat and by  increasing your daily activity level.    Here are some ways to improve your nutrition:  Eat less fat (especially butter, Crisco and other saturated fats)  Buy lean cuts of meat, reduce your portions of red meat or substitute poultry or fish  Use skim milk and low-fat dairy products  Eat no more than 4 egg yolks per week  Avoid fried or fast foods that are high in fat  Eat more fruits and vegetables      Also consider starting or increasing your aerobic activity. Aerobic activity is the best way to improve HDL (good) cholesterol. If this would be new to you, please talk with me first about what activities are safe for you.      Other lab results:    Your HIV and hepatitis C tests were normal.      Please feel free to contact us with any questions or if you would like more information.      Camila Calderon M.D.

## 2022-09-29 NOTE — RESULT ENCOUNTER NOTE
Excellent! Please call or send a [employer] message if you have any questions. Camila Calderon M.D.

## 2023-04-10 NOTE — TELEPHONE ENCOUNTER
FUTURE VISIT INFORMATION      FUTURE VISIT INFORMATION:    Date: 5/24/23    Time: 8:20am    Location: [facility]  REFERRAL INFORMATION:    Referring provider:      Referring providers clinic:      Reason for visit/diagnosis  Ear Wax Removal, Pt wife made appt for [facility] location    RECORDS REQUESTED FROM:       Clinic name Comments Records Status Imaging Status   ENT Specialty  10/5/22- office visit  Scanned in epic

## 2023-04-30 DIAGNOSIS — F43.23 ADJUSTMENT DISORDER WITH MIXED ANXIETY AND DEPRESSED MOOD: ICD-10-CM

## 2023-05-03 ENCOUNTER — MYC MEDICAL ADVICE (OUTPATIENT)
Dept: FAMILY MEDICINE | Facility: CLINIC | Age: 33
End: 2023-05-03
Payer: COMMERCIAL

## 2023-05-03 RX ORDER — ESCITALOPRAM OXALATE 20 MG/1
TABLET ORAL
Qty: 90 TABLET | Refills: 0 | Status: SHIPPED | OUTPATIENT
Start: 2023-05-03 | End: 2023-09-11

## 2023-05-03 ASSESSMENT — PATIENT HEALTH QUESTIONNAIRE - PHQ9
SUM OF ALL RESPONSES TO PHQ QUESTIONS 1-9: 2
SUM OF ALL RESPONSES TO PHQ QUESTIONS 1-9: 2
10. IF YOU CHECKED OFF ANY PROBLEMS, HOW DIFFICULT HAVE THESE PROBLEMS MADE IT FOR YOU TO DO YOUR WORK, TAKE CARE OF THINGS AT HOME, OR GET ALONG WITH OTHER PEOPLE: NOT DIFFICULT AT ALL

## 2023-05-03 NOTE — TELEPHONE ENCOUNTER
3/17/2022    10:03 AM 4/21/2022     9:16 AM 5/3/2023     8:32 AM   PHQ   PHQ-9 Total Score 10 4 2   Q9: Thoughts of better off dead/self-harm past 2 weeks Not at all Not at all Not at all

## 2023-05-03 NOTE — TELEPHONE ENCOUNTER
--PHQ-9 sent to patient today.    --Last visit:  9/28/2022  And plan RTC one year.    --Future Visit: 10/3/23 [facility].          3/3/2022    10:01 AM 3/17/2022    10:03 AM 4/21/2022     9:16 AM   PHQ   PHQ-9 Total Score 13 10 4   Q9: Thoughts of better off dead/self-harm past 2 weeks Several days Not at all Not at all   F/U: Thoughts of suicide or self-harm No     F/U: Safety concerns No       ---Prescription approved per [facility] [place] [facility].       Irene Valerio RN BSN     [facility]

## 2023-05-24 ENCOUNTER — PRE VISIT (OUTPATIENT)
Dept: OTOLARYNGOLOGY | Facility: CLINIC | Age: 33
End: 2023-05-24

## 2023-05-25 ENCOUNTER — NURSE TRIAGE (OUTPATIENT)
Dept: NURSING | Facility: CLINIC | Age: 33
End: 2023-05-25
Payer: COMMERCIAL

## 2023-05-26 NOTE — TELEPHONE ENCOUNTER
Nurse Triage SBAR    Is this a 2nd Level Triage? NO    Situation: dog bite/laceration    Background: Patient was giving his dog a pill and the cuticle on his finger was lacerated when his finger was removed from the dogs mouth. Patient washed that finger with soap and water. The laceration only bled a short time. Patient states that the dog is up to date on vaccinations.  Patient states that he is up to date on tetnus    Assessment: Patient has a laceration on his finger from his dogs tooth    Protocol Recommended Disposition:   Home Care    Recommendation: Patient verbalized understanding of care advice.       Leticia Crabtree RN on 5/25/2023 at 8:42 PM      Does the patient meet one of the following criteria for ADS visit consideration? 16+ years old, with an [facility] PCP     TIP  Providers, please consider if this condition is appropriate for management at one of our Acute and Diagnostic Services sites.     If patient is a good candidate, please use dotphrase <dot>triageresponse and select Refer to ADS to document.    Reason for Disposition    Cut that's too small to irrigate (<1/8 inch or 3 mm)    Additional Information    Negative: [1] Major bleeding (e.g., actively dripping or spurting) AND [2] can't be stopped    Negative: Sounds like a life-threatening emergency to the triager    Negative: [1] Any break in skin from BITE (e.g., cut, puncture or scratch) AND[2] WILD animal at risk for RABIES (e.g., bat, raccoon, alvarado, skunk, coyote, other carnivores)    Negative: [1] Any break in skin from BITE (e.g., cut, puncture or scratch) AND[2] PET animal (e.g., dog, cat, or ferret) at risk for RABIES (e.g., sick, stray, unprovoked bite, developing country)    Negative: [1] Any break in skin from BITE (e.g., cut, puncture or scratch) AND[2] monkey    Negative: [1] EXPOSURE of non-intact skin (e.g., exposed person has dermatitis, abrasion, wound) AND[2] with animal BODY FLUID (e.g., saliva such as licking, blood, brain) AND[3]  animal at high-risk for RABIES (e.g., bat, raccoon, alvarado, skunk, coyote, other carnivores)    Negative: [1] Cut (length > 1/8 inch or 3 mm) or skin tear AND [2] any animal  (Exception: superficial scratch that doesn't go through dermis)    Negative: [1] Bleeding AND [2] won't stop after 10 minutes of direct pressure (using correct technique)    Negative: Description of bite sounds severe to the triager    Negative: [1] Puncture wound (hole through the skin) AND [2] from a cat bite (or deep claw puncture wound)    Negative: [1] Puncture wound or small cut AND [2] on face (Exception: puncture  from small pet such as gerbil, mouse, hamster, puppy)    Negative: [1] Puncture wound or small cut AND [2] on hands or genitals (Exception: puncture  from small pet such as gerbil, mouse, hamster, puppy or turtle)    Negative: [1] Puncture wound or small cut AND [2] weak immune system (e.g., HIV positive, cancer chemo, splenectomy, organ transplant, chronic steroids)    Negative: Looks infected (spreading redness, red streak, pus)    Negative: [1] No bite lia or scratch AND [2] suspected bat exposure (e.g., bat found in same room as sleeping adult)    Negative: [1] Taking antibiotic > 24 hours for infected bite AND [2] bite getting worse (more swollen, more redness and increased pain)    Negative: [1] Taking antibiotic > 48 hours (2 days) for infected bite AND [2] fever persists    Negative: [1] Taking antibiotic > 72 hours (3 days) for infected bite AND [2] has not improved (i.e., pain, pus, redness)    Negative: [1] No prior tetanus shots (or is not fully vaccinated) AND [2] any wound (e.g., cut, scrape)    Negative: [1] Last tetanus shot > 5 years ago AND [2] any wound (e.g., cut, scrape)    Negative: [1] Taking antibiotic < 48 hours for infected bite AND [2] fever persists    Negative: [1] Taking antibiotic < 72 hours (3 days) for infected bite AND [2] has not improved    Protocols used: ANIMAL BITE-A-

## 2023-05-27 ENCOUNTER — MYC MEDICAL ADVICE (OUTPATIENT)
Dept: FAMILY MEDICINE | Facility: CLINIC | Age: 33
End: 2023-05-27
Payer: COMMERCIAL

## 2023-06-06 ASSESSMENT — ANXIETY QUESTIONNAIRES
GAD7 TOTAL SCORE: 2
1. FEELING NERVOUS, ANXIOUS, OR ON EDGE: NOT AT ALL
3. WORRYING TOO MUCH ABOUT DIFFERENT THINGS: NOT AT ALL
2. NOT BEING ABLE TO STOP OR CONTROL WORRYING: NOT AT ALL
6. BECOMING EASILY ANNOYED OR IRRITABLE: SEVERAL DAYS
4. TROUBLE RELAXING: SEVERAL DAYS
5. BEING SO RESTLESS THAT IT IS HARD TO SIT STILL: NOT AT ALL
7. FEELING AFRAID AS IF SOMETHING AWFUL MIGHT HAPPEN: NOT AT ALL
IF YOU CHECKED OFF ANY PROBLEMS ON THIS QUESTIONNAIRE, HOW DIFFICULT HAVE THESE PROBLEMS MADE IT FOR YOU TO DO YOUR WORK, TAKE CARE OF THINGS AT HOME, OR GET ALONG WITH OTHER PEOPLE: NOT DIFFICULT AT ALL

## 2023-06-21 ASSESSMENT — ANXIETY QUESTIONNAIRES: GAD7 TOTAL SCORE: 2

## 2023-06-22 ENCOUNTER — VIRTUAL VISIT (OUTPATIENT)
Dept: FAMILY MEDICINE | Facility: CLINIC | Age: 33
End: 2023-06-22
Payer: COMMERCIAL

## 2023-06-22 DIAGNOSIS — F43.23 ADJUSTMENT DISORDER WITH MIXED ANXIETY AND DEPRESSED MOOD: ICD-10-CM

## 2023-06-22 PROCEDURE — 99214 OFFICE O/P EST MOD 30 MIN: CPT | Mod: VID | Performed by: FAMILY MEDICINE

## 2023-06-22 PROCEDURE — 96127 BRIEF EMOTIONAL/BEHAV ASSMT: CPT | Mod: VID | Performed by: FAMILY MEDICINE

## 2023-06-22 RX ORDER — ESCITALOPRAM OXALATE 10 MG/1
10 TABLET ORAL DAILY
Qty: 30 TABLET | Refills: 1 | Status: SHIPPED | OUTPATIENT
Start: 2023-06-22 | End: 2023-09-11

## 2023-06-22 ASSESSMENT — ANXIETY QUESTIONNAIRES
4. TROUBLE RELAXING: SEVERAL DAYS
6. BECOMING EASILY ANNOYED OR IRRITABLE: SEVERAL DAYS
2. NOT BEING ABLE TO STOP OR CONTROL WORRYING: NOT AT ALL
GAD7 TOTAL SCORE: 2
3. WORRYING TOO MUCH ABOUT DIFFERENT THINGS: NOT AT ALL
5. BEING SO RESTLESS THAT IT IS HARD TO SIT STILL: NOT AT ALL
IF YOU CHECKED OFF ANY PROBLEMS ON THIS QUESTIONNAIRE, HOW DIFFICULT HAVE THESE PROBLEMS MADE IT FOR YOU TO DO YOUR WORK, TAKE CARE OF THINGS AT HOME, OR GET ALONG WITH OTHER PEOPLE: NOT DIFFICULT AT ALL
8. IF YOU CHECKED OFF ANY PROBLEMS, HOW DIFFICULT HAVE THESE MADE IT FOR YOU TO DO YOUR WORK, TAKE CARE OF THINGS AT HOME, OR GET ALONG WITH OTHER PEOPLE?: NOT DIFFICULT AT ALL
GAD7 TOTAL SCORE: 2
7. FEELING AFRAID AS IF SOMETHING AWFUL MIGHT HAPPEN: NOT AT ALL
1. FEELING NERVOUS, ANXIOUS, OR ON EDGE: NOT AT ALL
GAD7 TOTAL SCORE: 2
7. FEELING AFRAID AS IF SOMETHING AWFUL MIGHT HAPPEN: NOT AT ALL

## 2023-06-22 NOTE — PATIENT INSTRUCTIONS
Reduce your dose to 10mg one tablet daily for 2 weeks then you can reduce the dose to 1/2 tablet daily for two weeks, then stop the medication. You can take longer to taper if you like. Some people are more sensitive to changes than others, so please let me know if you have difficulty with the taper.    Possible discontinuation symptoms:   Based upon results from multiple studies, the most common discontinuation symptoms appear to be [1-4,6,9-12,14,15,26,30]:  ?Dizziness  ?Fatigue  ?Headache  ?Nausea  Other common discontinuation symptoms include [1-4,6,9-12,14,15,26,30]:  ?Agitation  ?Anxiety  ?Chills without fever  ?Diaphoresis  ?Dysphoria  ?Electric shock-like sensations ( brain zaps )  ?Insomnia  ?Irritability  ?Myalgias  ?Paresthesias  ?Rhinorrhea  ?Tremor  ?Vivid dreams

## 2023-06-22 NOTE — PROGRESS NOTES
"Pat is a 32 year old who is being evaluated via a billable video visit.             Assessment & Plan     Adjustment disorder with mixed anxiety and depressed mood  Improved. He would like to taper off escitalopram. See pt instructions   - escitalopram (LEXAPRO) 10 MG tablet  Dispense: 30 tablet; Refill: 1    Patient Instructions   Reduce your dose to 10mg one tablet daily for 2 weeks then you can reduce the dose to 1/2 tablet daily for two weeks, then stop the medication. You can take longer to taper if you like. Some people are more sensitive to changes than others, so please let me know if you have difficulty with the taper.    Possible discontinuation symptoms:   Based upon results from multiple studies, the most common discontinuation symptoms appear to be [1-4,6,9-12,14,15,26,30]:  ?Dizziness  ?Fatigue  ?Headache  ?Nausea  Other common discontinuation symptoms include [1-4,6,9-12,14,15,26,30]:  ?Agitation  ?Anxiety  ?Chills without fever  ?Diaphoresis  ?Dysphoria  ?Electric shock-like sensations ( brain zaps )  ?Insomnia  ?Irritability  ?Myalgias  ?Paresthesias  ?Rhinorrhea  ?Tremor  ?Vivid dreams           BMI:   Estimated body mass index is 31.94 kg/m  as calculated from the following:    Height as of 9/28/22: 1.715 m (5' 7.5\").    Weight as of 9/28/22: 93.9 kg (207 lb).        Has preventive visit scheduled in October.     Camila Calderon MD   [facility]    Subjective   Lottie is a 32 year old, presenting for the following health issues:  No chief complaint on file.         No data to display              History of Present Illness       Mental Health Follow-up:  Patient presents to follow-up on Depression & Anxiety.Patient's depression since last visit has been:  Good  The patient is not having other symptoms associated with depression.  Patient's anxiety since last visit has been:  Good  The patient is not having other symptoms associated with anxiety.  Any significant life " events: No  Patient is not feeling anxious or having panic attacks.  Patient has no concerns about alcohol or drug use.    He eats 2-3 servings of fruits and vegetables daily.He consumes 1 sweetened beverage(s) daily.He exercises with enough effort to increase his heart rate 20 to 29 minutes per day.  He exercises with enough effort to increase his heart rate 4 days per week.   He is taking medications regularly.  Today's LIDIA-7 Score: 2     Mood is doing very well. Will be spending the summer at home with his 18 month old and will be exploring splash pads around the cities.     Has a preventive visit scheduled in October.   Lottie Hollis  to [facility] Camila ROYAL MD)          5/27/23 11:48 PM  José Jensen,     I m interested in starting to step down my Lexapro prescription and ultimately get off of it entirely. Is that something we can talk about in a virtual visit or should I schedule a time to come in person?     Thanks!            Review of Systems          Objective           Vitals:  No vitals were obtained today due to virtual visit.    Physical Exam   GENERAL: Healthy, alert and no distress  EYES: Eyes grossly normal to inspection.  No discharge or erythema, or obvious scleral/conjunctival abnormalities.  RESP: No audible wheeze, cough, or visible cyanosis.  No visible retractions or increased work of breathing.    SKIN: Visible skin clear. No significant rash, abnormal pigmentation or lesions.  NEURO: Cranial nerves grossly intact.  Mentation and speech appropriate for age.  PSYCH: Mentation appears normal, affect normal/bright, judgement and insight intact, normal speech and appearance well-groomed.                  Video-Visit Details    Type of service:  Video Visit     Originating Location (pt. Location): Home    Distant Location (provider location):  On-site  Platform used for Video Visit: [employer]

## 2023-08-09 ENCOUNTER — TELEPHONE (OUTPATIENT)
Dept: FAMILY MEDICINE | Facility: CLINIC | Age: 33
End: 2023-08-09
Payer: COMMERCIAL

## 2023-08-09 ENCOUNTER — NURSE TRIAGE (OUTPATIENT)
Dept: FAMILY MEDICINE | Facility: CLINIC | Age: 33
End: 2023-08-09
Payer: COMMERCIAL

## 2023-08-09 DIAGNOSIS — U07.1 INFECTION DUE TO COVID-19 VIRUS VARIANT OF CONCERN: Primary | ICD-10-CM

## 2023-08-09 NOTE — TELEPHONE ENCOUNTER
RN COVID TREATMENT VISIT  08/09/23      The patient has been triaged and does not require a higher level of care.    Stas Hollis  32 year old  Current weight? 207lb    Has the patient been seen by a primary care provider at an [facility] or [facility] within the past two years? Yes.   Have you been in close proximity to/do you have a known exposure to a person with a confirmed case of influenza? No.     General treatment eligibility:  Date of positive COVID test (PCR or at home)?  8/9/2023    Are you or have you been hospitalized for this COVID-19 infection? No.   Have you received monoclonal antibodies or antiviral treatment for COVID-19 since this positive test? No.   Do you have any of the following conditions that place you at risk of being very sick from COVID-19?   - Overweight or Obesity (BMI >85th percentile or BMI 25 or higher)  Yes, patient has at least one high risk condition as noted above.     Current COVID symptoms:   - muscle or body aches  - sore throat  Yes. Patient has at least one symptom as selected.     How many days since symptoms started? 5 days or less. Established patient, 12 years or older weighing at least 88.2 lbs, who has symptoms that started in the past 5 days, has not been hospitalized nor received treatment already, and is at risk for being very sick from COVID-19.     Treatment eligibility by RN:  Are you currently pregnant or nursing? No  Do you have a clinically significant hypersensitivity to nirmatrelvir or ritonavir, or toxic epidermal necrolysis (TEN) or Solomon-Chiki Syndrome? No  Do you have a history of hepatitis, any hepatic impairment on the Problem List (such as Child-Samaniego Class C, cirrhosis, fatty liver disease, alcoholic liver disease), or was the last liver lab (hepatic panel, ALT, AST, ALK Phos, bilirubin) elevated in the past 6 months? No  Do you have any history of severe renal impairment (eGFR < 30mL/min)? No    Is patient  eligible to continue? Yes, patient meets all eligibility requirements for the RN COVID treatment (as denoted by all no responses above).     Current Outpatient Medications   Medication Sig Dispense Refill    escitalopram (LEXAPRO) 10 MG tablet Take 1 tablet (10 mg) by mouth daily 30 tablet 1    escitalopram (LEXAPRO) 20 MG tablet TAKE ONE TABLET BY MOUTH ONCE DAILY 90 tablet 0       Medications from List 1 of the standing order (on medications that exclude the use of Paxlovid) that patient is taking: NONE. Is patient taking Dayne's Wort? No  Is patient taking Dayne's Wort or any meds from List 1? No.   Medications from List 2 of the standing order (on meds that provider needs to adjust) that patient is taking: NONE. Is patient on any of the meds from List 2? No.   Medications from List 3 of standing order (on meds that a RN needs to adjust) that patient is taking: NONE. Is patient on any meds from List 3? No.     Paxlovid has an approximate 90% reduction in hospitalization. Paxlovid can possibly cause altered sense of taste, diarrhea (loose, watery stools), high blood pressure, muscle aches.     Would patient like a Paxlovid prescription?   Yes.   No results found for: GFRESTIMATED    Was last eGFR reduced? No, eGFR 60 or greater/ No Result on record. Patient can receive the normal renal function dose. [facility]    Temporary change to home medications: None    All medication adjustments (holds, etc) were discussed with the patient and patient was asked to repeat back (teachback) their med adjustment.  Did patient understand med adjustment? No medication adjustments needed.         Reviewed the following instructions with the patient:    Paxlovid (nimatrelvir and ritonavir)    How it works  Two medicines (nirmatrelvir and ritonavir) are taken together. They stop the virus from growing. Less amount of virus is easier for your body to fight.    How to take  Medicine comes in  a daily container with both medicine tablets. Take by mouth twice daily (once in the morning, once at night) for 5 days.  The number of tablets to take varies by patient.  Don't chew or break capsules. Swallow whole.    When to take  Take as soon as possible after positive COVID-19 test result, and within 5 days of your first symptoms.    Possible side effects  Can cause altered sense of taste, diarrhea (loose, watery stools), high blood pressure, muscle aches.    Tamir Dailey RN        Reason for Disposition   [1] COVID-19 diagnosed by positive lab test (e.g., PCR, rapid self-test kit) AND [2] mild symptoms (e.g., cough, fever, others) AND [3] no complications or SOB    Additional Information   Negative: SEVERE difficulty breathing (e.g., struggling for each breath, speaks in single words)   Negative: Difficult to awaken or acting confused (e.g., disoriented, slurred speech)   Negative: Bluish (or gray) lips or face now   Negative: Shock suspected (e.g., cold/pale/clammy skin, too weak to stand, low BP, rapid pulse)   Negative: Sounds like a life-threatening emergency to the triager   Negative: [1] Diagnosed or suspected COVID-19 AND [2] symptoms lasting 3 or more weeks   Negative: [1] COVID-19 exposure AND [2] no symptoms   Negative: COVID-19 vaccine reaction suspected (e.g., fever, headache, muscle aches) occurring 1 to 3 days after getting vaccine   Negative: COVID-19 vaccine, questions about   Negative: [1] Lives with someone known to have influenza (flu test positive) AND [2] flu-like symptoms (e.g., cough, runny nose, sore throat, SOB; with or without fever)   Negative: [1] Adult with possible COVID-19 symptoms AND [2] triager concerned about severity of symptoms or other causes   Negative: COVID-19 and breastfeeding, questions about   Negative: SEVERE or constant chest pain or pressure  (Exception: Mild central chest pain, present only when coughing.)   Negative: MODERATE difficulty breathing (e.g.,  speaks in phrases, SOB even at rest, pulse 100-120)   Negative: [1] Headache AND [2] stiff neck (can't touch chin to chest)   Negative: Oxygen level (e.g., pulse oximetry) 90 percent or lower   Negative: Chest pain or pressure   Negative: Patient sounds very sick or weak to the triager   Negative: MILD difficulty breathing (e.g., minimal/no SOB at rest, SOB with walking, pulse <100)   Negative: Fever > 103 F (39.4 C)   Negative: [1] Fever > 101 F (38.3 C) AND [2] age > 60 years   Negative: [1] Fever > 100.0 F (37.8 C) AND [2] bedridden (e.g., nursing home patient, CVA, chronic illness, recovering from surgery)    Protocols used: Coronavirus (COVID-19) Diagnosed or [id number]

## 2023-08-09 NOTE — TELEPHONE ENCOUNTER
COVID Positive/Requesting COVID treatment    Patient is positive for COVID and requesting treatment options.    Date of positive COVID test (PCR or at home)? Home test  Current COVID symptoms: sore throat and congestion or runny nose  Date COVID symptoms began: 08/08/23    Message should be routed to clinic RN pool. Best phone number to use for call back: [phone]

## 2023-08-25 ENCOUNTER — NURSE TRIAGE (OUTPATIENT)
Dept: NURSING | Facility: CLINIC | Age: 33
End: 2023-08-25
Payer: COMMERCIAL

## 2023-08-25 NOTE — TELEPHONE ENCOUNTER
Nurse Triage SBAR    Situation:   -upper respiratory track infection     Background:   -Patient calling, It is okay to leave a detailed message at this number.     Assessment:   -postive for void 18 days ago  -took Paxlovid  -got rebound 2 days after stopping Paxlovid  -symptoms were then improving, then got worse over the past few days  -sore throat   -still cough - worse today  -runny nose  -rapid covid test is negative  -no fever    Recommendation:   See PCP Within 3 Days   -will call back to schedule if needed    SHAE CRENSHAW RN on 8/25/2023 at 5:56 PM       Reason for Disposition   [1] Nasal discharge AND [2] present > 10 days    Additional Information   Negative: SEVERE difficulty breathing (e.g., struggling for each breath, speaks in single words)   Negative: Sounds like a life-threatening emergency to the triager   Negative: [1] Difficulty breathing AND [2] not from stuffy nose (e.g., not relieved by cleaning out the nose)   Negative: Runny nose is caused by pollen or other allergies   Negative: Cough is main symptom   Negative: Severe sore throat   Negative: Fever > 104 F (40 C)   Negative: Patient sounds very sick or weak to the triager   Negative: [1] Fever > 101 F (38.3 C) AND [2] age > 60 years   Negative: [1] Fever > 100.0 F (37.8 C) AND [2] bedridden (e.g., CVA, chronic illness, recovering from surgery)   Negative: [1] Fever > 100.0 F (37.8 C) AND [2] diabetes mellitus or weak immune system (e.g., HIV positive, cancer chemo, splenectomy, organ transplant, chronic steroids)   Negative: Fever present > 3 days (72 hours)   Negative: [1] Fever returns after gone for over 24 hours AND [2] symptoms worse or not improved   Negative: [1] Sinus pain (not just congestion) AND [2] fever   Negative: Earache   Negative: [1] SEVERE sore throat AND [2] present > 24 hours   Negative: [1] Sinus congestion (pressure, fullness) AND [2] present > 10 days    Protocols used: Common Cold-A-AH

## 2023-09-11 ENCOUNTER — OFFICE VISIT (OUTPATIENT)
Dept: FAMILY MEDICINE | Facility: CLINIC | Age: 33
End: 2023-09-11
Payer: COMMERCIAL

## 2023-09-11 VITALS
OXYGEN SATURATION: 98 % | HEART RATE: 87 BPM | WEIGHT: 223 LBS | TEMPERATURE: 97.7 F | HEIGHT: 68 IN | SYSTOLIC BLOOD PRESSURE: 114 MMHG | RESPIRATION RATE: 14 BRPM | DIASTOLIC BLOOD PRESSURE: 82 MMHG | BODY MASS INDEX: 33.8 KG/M2

## 2023-09-11 DIAGNOSIS — U07.1 COVID-19 VIRUS INFECTION: Primary | ICD-10-CM

## 2023-09-11 PROCEDURE — 99212 OFFICE O/P EST SF 10 MIN: CPT | Performed by: FAMILY MEDICINE

## 2023-09-11 ASSESSMENT — ENCOUNTER SYMPTOMS: COUGH: 1

## 2023-09-11 NOTE — PROGRESS NOTES
"  Assessment & Plan     COVID-19 virus infection  Symptoms are continuing to spontaneously improve and are mostly gone at this point (4 weeks after initial infection).  Advised patient that symptoms should continue to improve until hopefully full resolution is achieved in the next couple of weeks.             BMI:   Estimated body mass index is 34.41 kg/m  as calculated from the following:    Height as of this encounter: 1.715 m (5' 7.5\").    Weight as of this encounter: 101.2 kg (223 lb).           Chris Fernandez MD  [facility]    Harry Tafoya is a 32 year old, presenting for the following health issues:  Cough (With production, tested positive for covid 8/10/23. Has had a negative test on 8/25/23. Cough is getting better)      9/11/2023     7:40 AM   Additional Questions   Roomed by        Cough    History of Present Illness       Reason for visit:  Persistent cough following COVID-19 infection  Symptom onset:  3-4 weeks ago  Symptoms include:  Cough with phlegm, some congestion  Symptom intensity:  Moderate  Symptom progression:  Staying the same  Had these symptoms before:  No  What makes it worse:  Lying down sometimes  What makes it better:  Cough suppressant helps somewhat    He eats 2-3 servings of fruits and vegetables daily.He consumes 1 sweetened beverage(s) daily.He exercises with enough effort to increase his heart rate 30 to 60 minutes per day.  He exercises with enough effort to increase his heart rate 5 days per week.   He is taking medications regularly.     COVID-19: Tested positive for COVID-19 August 10th (1 month ago), first negative test was August 25th. He complains of persistent cough, worse with laying down, but that improved over the last couple of nights. No other persistent symptoms. Initially, COVID-19 infection he had muscle aches, sinus drainage, he did take Paxlovid. Sinus drainage has been improving.            Review of Systems   Respiratory:  " "Positive for cough.             Objective    /82 (BP Location: Right arm, Patient Position: Sitting, Cuff Size: Adult Large)   Pulse 87   Temp 97.7  F (36.5  C) (Oral)   Resp 14   Ht 1.715 m (5' 7.5\")   Wt 101.2 kg (223 lb)   SpO2 98%   BMI 34.41 kg/m    Body mass index is 34.41 kg/m .  Physical Exam   GENERAL: healthy, alert and no distress  HENT: nose and mouth without ulcers or lesions  RESP: lungs clear to auscultation - no rales, rhonchi or wheezes                      "

## 2023-10-03 ENCOUNTER — OFFICE VISIT (OUTPATIENT)
Dept: FAMILY MEDICINE | Facility: CLINIC | Age: 33
End: 2023-10-03
Attending: FAMILY MEDICINE
Payer: COMMERCIAL

## 2023-10-03 VITALS
HEIGHT: 68 IN | WEIGHT: 219.8 LBS | BODY MASS INDEX: 33.31 KG/M2 | SYSTOLIC BLOOD PRESSURE: 122 MMHG | RESPIRATION RATE: 16 BRPM | TEMPERATURE: 98.4 F | HEART RATE: 89 BPM | OXYGEN SATURATION: 98 % | DIASTOLIC BLOOD PRESSURE: 84 MMHG

## 2023-10-03 DIAGNOSIS — E66.811 CLASS 1 OBESITY DUE TO EXCESS CALORIES WITHOUT SERIOUS COMORBIDITY WITH BODY MASS INDEX (BMI) OF 33.0 TO 33.9 IN ADULT: ICD-10-CM

## 2023-10-03 DIAGNOSIS — Z80.8 FAMILY HISTORY OF SKIN CANCER: ICD-10-CM

## 2023-10-03 DIAGNOSIS — Z13.1 SCREENING FOR DIABETES MELLITUS: ICD-10-CM

## 2023-10-03 DIAGNOSIS — E66.09 CLASS 1 OBESITY DUE TO EXCESS CALORIES WITHOUT SERIOUS COMORBIDITY WITH BODY MASS INDEX (BMI) OF 33.0 TO 33.9 IN ADULT: ICD-10-CM

## 2023-10-03 DIAGNOSIS — Z13.220 LIPID SCREENING: ICD-10-CM

## 2023-10-03 DIAGNOSIS — Z83.438 FAMILY HISTORY OF HYPERLIPIDEMIA: ICD-10-CM

## 2023-10-03 DIAGNOSIS — Z00.00 ROUTINE GENERAL MEDICAL EXAMINATION AT A HEALTH CARE FACILITY: Primary | ICD-10-CM

## 2023-10-03 LAB
CHOLEST SERPL-MCNC: 179 MG/DL
FASTING STATUS PATIENT QL REPORTED: YES
GLUCOSE SERPL-MCNC: 105 MG/DL (ref 70–99)
HDLC SERPL-MCNC: 30 MG/DL
LDLC SERPL CALC-MCNC: 94 MG/DL
NONHDLC SERPL-MCNC: 149 MG/DL
TRIGL SERPL-MCNC: 275 MG/DL

## 2023-10-03 PROCEDURE — 99395 PREV VISIT EST AGE 18-39: CPT | Mod: 25 | Performed by: FAMILY MEDICINE

## 2023-10-03 PROCEDURE — 82947 ASSAY GLUCOSE BLOOD QUANT: CPT | Performed by: FAMILY MEDICINE

## 2023-10-03 PROCEDURE — 90471 IMMUNIZATION ADMIN: CPT | Performed by: FAMILY MEDICINE

## 2023-10-03 PROCEDURE — 90686 IIV4 VACC NO PRSV 0.5 ML IM: CPT | Performed by: FAMILY MEDICINE

## 2023-10-03 PROCEDURE — 36415 COLL VENOUS BLD VENIPUNCTURE: CPT | Performed by: FAMILY MEDICINE

## 2023-10-03 PROCEDURE — 80061 LIPID PANEL: CPT | Performed by: FAMILY MEDICINE

## 2023-10-03 ASSESSMENT — ENCOUNTER SYMPTOMS
EYE PAIN: 0
FEVER: 0
NAUSEA: 0
NERVOUS/ANXIOUS: 0
HEARTBURN: 0
CONSTIPATION: 0
DIARRHEA: 0
PARESTHESIAS: 0
PALPITATIONS: 0
WEAKNESS: 0
FREQUENCY: 0
MYALGIAS: 0
HEADACHES: 0
HEMATOCHEZIA: 0
COUGH: 0
DIZZINESS: 0
SORE THROAT: 0
SHORTNESS OF BREATH: 0
ABDOMINAL PAIN: 0
JOINT SWELLING: 0
HEMATURIA: 0
CHILLS: 0
ARTHRALGIAS: 0
DYSURIA: 0

## 2023-10-03 ASSESSMENT — PAIN SCALES - GENERAL: PAINLEVEL: NO PAIN (0)

## 2023-10-03 NOTE — PATIENT INSTRUCTIONS
Schedule a dermatology visit for skin cancer screening  Let me know if you would like a referral to urology if you still have difficulty maintaining erections.       Preventive Health Recommendations  Male Ages 26 - 39    Yearly exam:             See your health care provider every year in order to  o   Review health changes.   o   Discuss preventive care.    o   Review your medicines if your doctor has prescribed any.  You should be tested each year for STDs (sexually transmitted diseases), if you re at risk.   After age 35, talk to your provider about cholesterol testing. If you are at risk for heart disease, have your cholesterol tested at least every 5 years.   If you are at risk for diabetes, you should have a diabetes test (fasting glucose).  Shots: Get a flu shot each year. Get a tetanus shot every 10 years.     Nutrition:  Eat at least 5 servings of fruits and vegetables daily.   Eat whole-grain bread, whole-wheat pasta and brown rice instead of white grains and rice.   Get adequate Calcium and Vitamin D.     Lifestyle  Exercise for at least 150 minutes a week (30 minutes a day, 5 days a week). This will help you control your weight and prevent disease.   Limit alcohol to one drink per day.   No smoking.   Wear sunscreen to prevent skin cancer.   See your dentist every six months for an exam and cleaning.

## 2023-10-03 NOTE — PROGRESS NOTES
SUBJECTIVE:   CC: Lottie is an 32 year old who presents for preventative health visit.       10/3/2023     8:32 AM   Additional Questions   Roomed by Fatoumata   Accompanied by Self         10/3/2023     8:32 AM   Patient Reported Additional Medications   Patient reports taking the following new medications None       Healthy Habits:     Getting at least 3 servings of Calcium per day:  Yes    Bi-annual eye exam:  Yes    Dental care twice a year:  Yes    Sleep apnea or symptoms of sleep apnea:  None    Diet:  Regular (no restrictions)    Frequency of exercise:  2-3 days/week    Duration of exercise:  15-30 minutes    Taking medications regularly:  Yes    Medication side effects:  Not applicable    Additional concerns today:  Yes    Was wondering if possible to do flu and covid vaccines.   Got covid for the first time in August. Had rebound covid. Has had a little trouble getting erections since then. Prior to covid would have trouble sometimes a little bit, but was more confidence/performance anxiety. He is unsure if he has morning erections, but is able to achieve an erection, just notices that it is not always a satisfying erection and sometimes does not last long. Has been able to reach orgasm. Prefers wait and see approach for now.     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 They are thinking about trying to get pregnant again in the near future.       Social History     Tobacco Use    Smoking status: Never    Smokeless tobacco: Never   Substance Use Topics    Alcohol use: Yes     Comment: weekly social drinking             10/3/2023     8:25 AM   Alcohol Use   Prescreen: >3 drinks/day or >7 drinks/week? No       Last PSA: No results found for: PSA    Reviewed orders with patient. Reviewed health maintenance and updated orders accordingly - Yes       Reviewed and updated as needed this visit by clinical staff   Tobacco  Allergies  Meds              Reviewed and updated as needed this visit by Provider         "         Past Medical History:   Diagnosis Date    NO ACTIVE PROBLEMS       Past Surgical History:   Procedure Laterality Date    no prior surgeries         Review of Systems   Constitutional:  Negative for chills and fever.   HENT:  Negative for congestion, ear pain, hearing loss and sore throat.    Eyes:  Negative for pain and visual disturbance.   Respiratory:  Negative for cough and shortness of breath.    Cardiovascular:  Negative for chest pain, palpitations and peripheral edema.   Gastrointestinal:  Negative for abdominal pain, constipation, diarrhea, heartburn, hematochezia and nausea.   Genitourinary:  Positive for impotence. Negative for dysuria, frequency, genital sores, hematuria, penile discharge and urgency.   Musculoskeletal:  Negative for arthralgias, joint swelling and myalgias.   Skin:  Negative for rash.   Neurological:  Negative for dizziness, weakness, headaches and paresthesias.   Psychiatric/Behavioral:  Negative for mood changes. The patient is not nervous/anxious.      Over the past couple of years has been building up enough ear wax to go to ENT to flush out.     OBJECTIVE:   /84 (BP Location: Right arm, Patient Position: Sitting, Cuff Size: Adult Large)   Pulse 89   Temp 98.4  F (36.9  C) (Temporal)   Resp 16   Ht 1.715 m (5' 7.52\")   Wt 99.7 kg (219 lb 12.8 oz)   SpO2 98%   BMI 33.90 kg/m    Wt Readings from Last 5 Encounters:   10/03/23 99.7 kg (219 lb 12.8 oz)   09/11/23 101.2 kg (223 lb)   09/28/22 93.9 kg (207 lb)   08/24/21 76.8 kg (169 lb 4.8 oz)   05/05/21 83.9 kg (185 lb)      Physical Exam  GENERAL: healthy, alert and no distress  EYES: Eyes grossly normal to inspection, PERRL and conjunctivae and sclerae normal  HENT: ear canals and TM's normal, nose and mouth without ulcers or lesions  NECK: no adenopathy, no asymmetry, masses, or scars and thyroid normal to palpation  RESP: lungs clear to auscultation - no rales, rhonchi or wheezes  CV: regular rate and rhythm, " normal S1 S2, no S3 or S4, no murmur, click or rub, no peripheral edema and peripheral pulses strong  ABDOMEN: soft, nontender, no hepatosplenomegaly, no masses and bowel sounds normal  MS: no gross musculoskeletal defects noted, no edema  SKIN: no suspicious lesions or rashes  NEURO: Normal strength and tone, mentation intact and speech normal  PSYCH: mentation appears normal, affect normal/bright    Diagnostic Test Results:  Labs reviewed in Epic    ASSESSMENT/PLAN:       ICD-10-CM    1. Routine general medical examination at a health care facility  Z00.00       2. Class 1 obesity due to excess calories without serious comorbidity with body mass index (BMI) of 33.0 to 33.9 in adult  E66.09 Lipid panel reflex to direct LDL Fasting    Z68.33 Glucose      3. Family history of skin cancer  Z80.8       4. Family history of hyperlipidemia  Z83.438 Lipid panel reflex to direct LDL Fasting      5. Lipid screening  Z13.220 Lipid panel reflex to direct LDL Fasting      6. Screening for diabetes mellitus  Z13.1 Glucose        Routine preventive visit  Lipid and glucose screening today, given fhx HLD and obesity  Flu vaccine recommended today  Recommended getting the COVID vaccine at a local pharmacy  Dietary changes for weight loss discussed.        Adjustment disorder with mixed anxiety and depressed mood  Improved. He tapered off lexapro this summer and is doing well.         Family history of skin cancer  Grandfather with melanoma history and sister with history of precancerous skin lesion. Last dermatology appointment in 2021 and was told to return in about two years. He will schedule     Class 1 obesity  Working on improving diet. Stopped drinking soda.  He is in the middle of an unexpected kitchen remodel due to  leakage so has not had a great diet for the past few months, but plans on making some changes.  Has been working on increasing exercise.    He will let me know if ongoing difficulty with erections  "and would refer to urology.          COUNSELING:   Reviewed preventive health counseling, as reflected in patient instructions      BMI:   Estimated body mass index is 33.9 kg/m  as calculated from the following:    Height as of this encounter: 1.715 m (5' 7.52\").    Weight as of this encounter: 99.7 kg (219 lb 12.8 oz).   Weight management plan: diet and exercise      He reports that he has never smoked. He has never used smokeless tobacco.            Camila Calderon MD   [facility]  "

## 2023-10-09 NOTE — RESULT ENCOUNTER NOTE
The results of your recent lipid (cholesterol) profile were abnormal.    Here are the results:  Lab Results       Component                Value               Date                       CHOL                     179                 10/03/2023                 CHOL                     158                 05/05/2021            Lab Results       Component                Value               Date                       HDL                      30                  10/03/2023                 HDL                      53                  05/05/2021            Lab Results       Component                Value               Date                       LDL                      94                  10/03/2023                 LDL                      90                  05/05/2021            Lab Results       Component                Value               Date                       TRIG                     275                 10/03/2023                 TRIG                     75                  05/05/2021            No results found for: CHOLHDLRATIO    Desired or goal levels are:  CHOLESTEROL: Desirable is less than 200.   HDL (Good Cholesterol): Desirable is greater than 40 (for men) greater than 50 (for women).  LDL (Bad Cholesterol): Desirable is less than 130 (or less than 100 if you have heart disease or diabetes). Borderline 130-160.  TRIGLYCERIDES: Desirable is less than 150.  Borderline is 150-200.    For high triglycerides, reduce the intake of jam, jelly, honey, alcohol, and/or coffee creamers  Your HDL (the good cholesterol) level is low, no medication is required at this point. Reducing your total fat intake, increasing exercise level, reducing weight, adding olive oil to your diet, etc, may help to increase this level..    As you may know, an elevated cholesterol is one factor that increases your risk for heart disease and stroke. You can improve your cholesterol by controlling the amount and type of fat you eat and by  increasing your daily activity level.    Here are some ways to improve your nutrition:  Eat less fat (especially butter, Crisco and other saturated fats)  Buy lean cuts of meat, reduce your portions of red meat or substitute poultry or fish  Use skim milk and low-fat dairy products  Eat no more than 4 egg yolks per week  Avoid fried or fast foods that are high in fat  Eat more fruits and vegetables      Also consider starting or increasing your aerobic activity. Aerobic activity is the best way to improve HDL (good) cholesterol. If this would be new to you, please talk with me first about what activities are safe for you.      Other lab results:    Your blood glucose (blood sugar) was also slightly elevated. You do not have diabetes, but we consider this a pre-diabetic state.  I would recommend rechecking your blood glucose in one year. Improving lifestyle habits such as regular exercise, good diet and weight loss will can slow or stop the progression towards diabetes.       Please feel free to contact us with any questions or if you would like more information.      Camila Calderon M.D.

## 2023-11-10 ENCOUNTER — MYC MEDICAL ADVICE (OUTPATIENT)
Dept: FAMILY MEDICINE | Facility: CLINIC | Age: 33
End: 2023-11-10
Payer: COMMERCIAL

## 2023-11-10 DIAGNOSIS — N52.9 ERECTILE DYSFUNCTION, UNSPECIFIED ERECTILE DYSFUNCTION TYPE: Primary | ICD-10-CM

## 2023-12-05 ENCOUNTER — VIRTUAL VISIT (OUTPATIENT)
Dept: UROLOGY | Facility: CLINIC | Age: 33
End: 2023-12-05
Attending: FAMILY MEDICINE
Payer: COMMERCIAL

## 2023-12-05 DIAGNOSIS — N52.9 ERECTILE DYSFUNCTION, UNSPECIFIED ERECTILE DYSFUNCTION TYPE: Primary | ICD-10-CM

## 2023-12-05 PROCEDURE — 99204 OFFICE O/P NEW MOD 45 MIN: CPT | Mod: VID | Performed by: PHYSICIAN ASSISTANT

## 2023-12-05 RX ORDER — TADALAFIL 5 MG/1
5 TABLET ORAL EVERY 24 HOURS
Qty: 90 TABLET | Refills: 4 | Status: SHIPPED | OUTPATIENT
Start: 2023-12-05

## 2023-12-05 ASSESSMENT — PAIN SCALES - GENERAL: PAINLEVEL: NO PAIN (0)

## 2023-12-05 NOTE — LETTER
12/5/2023       RE: Stas Hollis  [address]  [place] [address]     Dear Colleague,    Thank you for referring your patient, Stas Hollis, to the [facility] [place] at [facility]. Please see a copy of my visit note below.    How would you like to obtain your AVS? MyChart  If the video visit is dropped, the invitation should be resent by: Text to cell phone: [phone]  Will anyone else be joining your video visit? No          Video-Visit Details    Type of service:  Video Visit     Originating Location (pt. Location): Home    Distant Location (provider location):  On-site  Platform used for Video Visit: [employer]  Start time: 10:37am  End time: 10:43am    CC: ED issue    HPI:  Stas Hollis is a pleasant 33 year old male who presents in consultation from Dr. Calderon for evaluation of the above. Post covid in Aug noted to have some issue with maintaining an erection at times. Has good libido and does not seem to be an issue. Can't help think about performance ad if it will be successful to end with orgasm.     Pre-DM noted.     Past Medical History:   Diagnosis Date    NO ACTIVE PROBLEMS        Past Surgical History:   Procedure Laterality Date    no prior surgeries         Social History     Socioeconomic History    Marital status:      Spouse name: Not on file    Number of children: Not on file    Years of education: Not on file    Highest education level: Not on file   Occupational History    Not on file   Tobacco Use    Smoking status: Never    Smokeless tobacco: Never   Vaping Use    Vaping Use: Never used   Substance and Sexual Activity    Alcohol use: Yes     Comment: weekly social drinking    Drug use: Never    Sexual activity: Yes     Partners: Female     Birth control/protection: Pull-out method, Condom   Other Topics Concern    Parent/sibling w/ CABG, MI or angioplasty before 65F 55M? No   Social History  Narrative    Not on file     Social Determinants of Health     Financial Resource Strain: Low Risk  (10/3/2023)    Financial Resource Strain     Within the past 12 months, have you or your family members you live with been unable to get utilities (heat, electricity) when it was really needed?: No   Food Insecurity: Low Risk  (10/3/2023)    Food Insecurity     Within the past 12 months, did you worry that your food would run out before you got money to buy more?: No     Within the past 12 months, did the food you bought just not last and you didn t have money to get more?: No   Transportation Needs: Low Risk  (10/3/2023)    Transportation Needs     Within the past 12 months, has lack of transportation kept you from medical appointments, getting your medicines, non-medical meetings or appointments, work, or from getting things that you need?: No   Physical Activity: Not on file   Stress: Not on file   Social Connections: Not on file   Interpersonal Safety: Low Risk  (10/3/2023)    Interpersonal Safety     Do you feel physically and emotionally safe where you currently live?: Yes     Within the past 12 months, have you been hit, slapped, kicked or otherwise physically hurt by someone?: No     Within the past 12 months, have you been humiliated or emotionally abused in other ways by your partner or ex-partner?: No   Housing Stability: Low Risk  (10/3/2023)    Housing Stability     Do you have housing? : Yes     Are you worried about losing your housing?: No       Family History   Problem Relation Age of Onset    Breast Cancer Mother 50    Hyperlipidemia Father     Skin Cancer Sister         precancerous moles    No Known Problems Brother     Substance Abuse Brother         accidental overdose. bought xanax off the street and it was fentanyl    Cerebrovascular Disease Maternal Grandmother         stroke age 80s    Hypertension Maternal Grandmother     Valvular heart disease Maternal Grandmother     Dementia Maternal  Grandfather     Hypertension Maternal Grandfather     Melanoma Maternal Grandfather     Hypertension Paternal Grandmother     Lymphoma Paternal Grandmother          of lymphoma    Breast Cancer Paternal Grandmother         survived breast cancer    Hypertension Paternal Grandfather     Chronic Obstructive Pulmonary Disease Paternal Grandfather         smoker       No Known Allergies    No current outpatient medications on file.     No current facility-administered medications for this visit.         PEx:   There were no vitals taken for this visit.    PSYCH: NAD  EYES: EOMI  MOUTH: MMM  NEURO: AAO x3    Urine:       A/P: Stas Hollis is a 33 year old male with difficulty maintaining erection at times  -trial of low dose Cialis (5mg) daily. SE reviewed, including priapism.   -[facility]  -3 mo MyChart update. Hopeful to eventually not need the Cialis with adding counseling.       Maile Richards PA-C  [facility]        25 minutes spent on the date of the encounter doing chart review, review of outside records, review of test results, interpretation of tests, patient visit and documentation

## 2023-12-05 NOTE — PATIENT INSTRUCTIONS
[email or web address]    Notify me once you are signed up and I will send in Rx for cialis 5mg daily for ED.

## 2023-12-05 NOTE — NURSING NOTE
Is the patient currently in the state of [place]? YES    Visit mode:VIDEO    If the visit is dropped, the patient can be reconnected by: VIDEO VISIT: Text to cell phone:   Telephone Information:   Mobile [phone]       Will anyone else be joining the visit? NO  (If patient encounters technical issues they should call [phone])    How would you like to obtain your AVS? MyChart    Are changes needed to the allergy or medication list? Pt stated no changes to allergies and Pt stated no med changes    Reason for visit: RECHECK (New Urology, erectile dysfunction )    Sierra Antonio VVF

## 2023-12-05 NOTE — PROGRESS NOTES
How would you like to obtain your AVS? MyChart  If the video visit is dropped, the invitation should be resent by: Text to cell phone: [phone]  Will anyone else be joining your video visit? No          Video-Visit Details    Type of service:  Video Visit     Originating Location (pt. Location): Home    Distant Location (provider location):  On-site  Platform used for Video Visit: [employer]  Start time: 10:37am  End time: 10:43am    CC: ED issue    HPI:  Stas Hollis is a pleasant 33 year old male who presents in consultation from Dr. Calderon for evaluation of the above. Post covid in Aug noted to have some issue with maintaining an erection at times. Has good libido and does not seem to be an issue. Can't help think about performance ad if it will be successful to end with orgasm.     Pre-DM noted.     Past Medical History:   Diagnosis Date    NO ACTIVE PROBLEMS        Past Surgical History:   Procedure Laterality Date    no prior surgeries         Social History     Socioeconomic History    Marital status:      Spouse name: Not on file    Number of children: Not on file    Years of education: Not on file    Highest education level: Not on file   Occupational History    Not on file   Tobacco Use    Smoking status: Never    Smokeless tobacco: Never   Vaping Use    Vaping Use: Never used   Substance and Sexual Activity    Alcohol use: Yes     Comment: weekly social drinking    Drug use: Never    Sexual activity: Yes     Partners: Female     Birth control/protection: Pull-out method, Condom   Other Topics Concern    Parent/sibling w/ CABG, MI or angioplasty before 65F 55M? No   Social History Narrative    Not on file     Social Determinants of Health     Financial Resource Strain: Low Risk  (10/3/2023)    Financial Resource Strain     Within the past 12 months, have you or your family members you live with been unable to get utilities (heat, electricity) when it was really needed?: No   Food Insecurity: Low  Risk  (10/3/2023)    Food Insecurity     Within the past 12 months, did you worry that your food would run out before you got money to buy more?: No     Within the past 12 months, did the food you bought just not last and you didn t have money to get more?: No   Transportation Needs: Low Risk  (10/3/2023)    Transportation Needs     Within the past 12 months, has lack of transportation kept you from medical appointments, getting your medicines, non-medical meetings or appointments, work, or from getting things that you need?: No   Physical Activity: Not on file   Stress: Not on file   Social Connections: Not on file   Interpersonal Safety: Low Risk  (10/3/2023)    Interpersonal Safety     Do you feel physically and emotionally safe where you currently live?: Yes     Within the past 12 months, have you been hit, slapped, kicked or otherwise physically hurt by someone?: No     Within the past 12 months, have you been humiliated or emotionally abused in other ways by your partner or ex-partner?: No   Housing Stability: Low Risk  (10/3/2023)    Housing Stability     Do you have housing? : Yes     Are you worried about losing your housing?: No       Family History   Problem Relation Age of Onset    Breast Cancer Mother 50    Hyperlipidemia Father     Skin Cancer Sister         precancerous moles    No Known Problems Brother     Substance Abuse Brother         accidental overdose. bought xanax off the street and it was fentanyl    Cerebrovascular Disease Maternal Grandmother         stroke age 80s    Hypertension Maternal Grandmother     Valvular heart disease Maternal Grandmother     Dementia Maternal Grandfather     Hypertension Maternal Grandfather     Melanoma Maternal Grandfather     Hypertension Paternal Grandmother     Lymphoma Paternal Grandmother          of lymphoma    Breast Cancer Paternal Grandmother         survived breast cancer    Hypertension Paternal Grandfather     Chronic Obstructive Pulmonary  Disease Paternal Grandfather         smoker       No Known Allergies    No current outpatient medications on file.     No current facility-administered medications for this visit.         PEx:   There were no vitals taken for this visit.    PSYCH: NAD  EYES: EOMI  MOUTH: MMM  NEURO: AAO x3    Urine:       A/P: Stas Hollis is a 33 year old male with difficulty maintaining erection at times  -trial of low dose Cialis (5mg) daily. SE reviewed, including priapism.   -[facility] to [facility]  -3 mo MyChart update. Hopeful to eventually not need the Cialis with adding counseling.       Maile Richards PA-C  [facility]        25 minutes spent on the date of the encounter doing chart review, review of outside records, review of test results, interpretation of tests, patient visit and documentation

## 2024-04-04 ENCOUNTER — OFFICE VISIT (OUTPATIENT)
Dept: DERMATOLOGY | Facility: CLINIC | Age: 34
End: 2024-04-04
Payer: COMMERCIAL

## 2024-04-04 DIAGNOSIS — L81.4 SOLAR LENTIGO: ICD-10-CM

## 2024-04-04 DIAGNOSIS — D22.9 MULTIPLE BENIGN NEVI: Primary | ICD-10-CM

## 2024-04-04 DIAGNOSIS — D18.01 CHERRY ANGIOMA: ICD-10-CM

## 2024-04-04 PROCEDURE — 99203 OFFICE O/P NEW LOW 30 MIN: CPT | Mod: GC | Performed by: STUDENT IN AN ORGANIZED HEALTH CARE EDUCATION/TRAINING PROGRAM

## 2024-04-04 ASSESSMENT — PAIN SCALES - GENERAL: PAINLEVEL: NO PAIN (0)

## 2024-04-04 NOTE — PROGRESS NOTES
[facility] Dermatology Note  Encounter Date: Apr 4, 2024  Office Visit     Dermatology Problem List:  1. History of dysplastic nevus  -Right buttock, mild atypia, s/p biopsy 7/24/18 at [facility]  2. Family history of skin cancer in sister, unknown type.  FBSE 4/4/24    ____________________________________________    Assessment & Plan:       # Benign lesions: Multiple benign nevi, solar lentigines, cherry angiomas. Explained to patient benign nature of lesion. No treatment is necessary at this time unless the lesion changes or becomes symptomatic.   - ABCDEs of melanoma reviewed  - signs and symptoms of NMSC reviewed  - Sun precaution was advised including the use of sun screens of SPF 30 or higher and sun protective clothing    Procedures Performed:     None    Follow-up: prn for new or changing lesions    Staff and Resident:     Praveen Li MD  Internal Medicine-Dermatology PGY-2    Staff: Dr DEBRA Cabrales     ____________________________________________    CC: No chief complaint on file.    HPI:  Mr. Stas Hollis is a(n) 33 year old male who presents today as a return patient for skin check    Last seen in 2019 with Dr. Vegas. Had benign skin check at that time.   No personal history of skin cancer. Melanoma in maternal grandfather, but no first degree relative.  No concerns today. No bleeding, growing, changing, tender lesions or new nevi      Patient is otherwise feeling well, without additional skin concerns.    Labs Reviewed:  N/A    Physical Exam:  Vitals: There were no vitals taken for this visit.  SKIN: Total skin excluding the undergarment areas was performed. The exam included the head/face, neck, both arms, chest, back, abdomen, both legs, digits and/or nails.   -few tan circular macules in photodistributed areas  -scattered red dome shapes papules and red macules on back and trunk  -few symmetric brown macules with symmetric reticular patterns on dermoscopy found on  extremities, back, trunk  - No other lesions of concern on areas examined.     Medications:  Current Outpatient Medications   Medication Sig Dispense Refill    tadalafil (CIALIS) 5 MG tablet Take 1 tablet (5 mg) by mouth every 24 hours 90 tablet 4     No current facility-administered medications for this visit.      Past Medical History:   Patient Active Problem List   Diagnosis    Family history of hyperlipidemia    Family history of skin cancer     Past Medical History:   Diagnosis Date    NO ACTIVE PROBLEMS        CC Referred Self, MD  No address on file on close of this encounter.

## 2024-04-04 NOTE — NURSING NOTE
Dermatology Rooming Note    Stas Hollis's goals for this visit include:   Chief Complaint   Patient presents with    Skin Check     Pat is here today for a skin check and does not have any areas of concern.     Yovanny LOYA, OLEG

## 2024-04-04 NOTE — PATIENT INSTRUCTIONS
ABCD of moles:   The common things to look for in moles are the ABCDEs, which stand for:   Asymmetry (the moles not being the same on the both sizes if the mole was cut in half)  Border (the mole should have a regular smooth border, a cloud shaped border or a jagged border is something to bring our attention to)  Color (multicolored or very black are atypical)  Diameter (bigger than 6 mm [a pencil eraser size])  Evolution (any lesions that is changing or worrisome, please let us know about immediately).

## 2024-04-04 NOTE — LETTER
4/4/2024       RE: Stas Hollis  [address]  [place] [address]     Dear Colleague,    Thank you for referring your patient, Stas Hollis, to the [facility]ERMATOLOGY [facility]IN[facility] [place] at [facility]. Please see a copy of my visit note below.    [facility] Dermatology Note  Encounter Date: Apr 4, 2024  Office Visit     Dermatology Problem List:  1. History of dysplastic nevus  -Right buttock, mild atypia, s/p [facility]  2. Family history of skin cancer in sister, unknown type.  FBSE 4/4/24    ____________________________________________    Assessment & Plan:       # Benign lesions: Multiple benign nevi, solar lentigines, cherry angiomas. Explained to patient benign nature of lesion. No treatment is necessary at this time unless the lesion changes or becomes symptomatic.   - ABCDEs of melanoma reviewed  - signs and symptoms of NMSC reviewed  - Sun precaution was advised including the use of sun screens of SPF 30 or higher and sun protective clothing    Procedures Performed:     None    Follow-up: prn for new or changing lesions    Staff and Resident:     Praveen Li MD  Internal Medicine-Dermatology PGY-2    Staff: Dr DEBRA Cabrales     ____________________________________________    CC: No chief complaint on file.    HPI:  Mr. Stas Hollis is a(n) 33 year old male who presents today as a return patient for skin check    Last seen in 2019 with Dr. Vegas. Had benign skin check at that time.   No personal history of skin cancer. Melanoma in maternal grandfather, but no first degree relative.  No concerns today. No bleeding, growing, changing, tender lesions or new nevi      Patient is otherwise feeling well, without additional skin concerns.    Labs Reviewed:  N/A    Physical Exam:  Vitals: There were no vitals taken for this visit.  SKIN: Total skin excluding the undergarment areas was  performed. The exam included the head/face, neck, both arms, chest, back, abdomen, both legs, digits and/or nails.   -few tan circular macules in photodistributed areas  -scattered red dome shapes papules and red macules on back and trunk  -few symmetric brown macules with symmetric reticular patterns on dermoscopy found on extremities, back, trunk  - No other lesions of concern on areas examined.     Medications:  Current Outpatient Medications   Medication Sig Dispense Refill    tadalafil (CIALIS) 5 MG tablet Take 1 tablet (5 mg) by mouth every 24 hours 90 tablet 4     No current facility-administered medications for this visit.      Past Medical History:   Patient Active Problem List   Diagnosis    Family history of hyperlipidemia    Family history of skin cancer     Past Medical History:   Diagnosis Date    NO ACTIVE PROBLEMS        CC Referred Self, MD  No address on file on close of this encounter.      Attestation signed by Jimy Cabrales MD at 4/4/2024  7:40 AM:  I have personally examined this patient and agree with the resident doctor's documentation and plan of care. I have reviewed and amended the resident's note. The documentation accurately reflects my clinical observations, diagnoses, treatment and follow-up plans.     Jimy Cabrales MD  Dermatology Staff

## 2024-05-13 NOTE — PATIENT INSTRUCTIONS
Dear Stas Hollis,    We are sorry you are not feeling well. Based on the responses you provided, it is recommended that you be seen in-person in urgent care so we can better evaluate your symptoms. Please click here to find the nearest urgent care location to you.   You will not be charged for this Visit. Thank you for trusting us with your care.    Bhavani Cox MD     SURGICAL ONCOLOGY HISTORY & PHYSICAL    CHIEF COMPLAINT:  pancreas cancer    HISTORY OF PRESENT ILLNESS:  Panc mass found incidentally -ED visit for fall/back pain     1/25/24 CT abd/pelvis  There is a low-attenuation mass in the tail the pancreas measuring 3.2 x 2.3 x 2.5 cm.  1/29/24 EUS/EGD  Hypoechoic pancreatic tail mass 3 cm, malignant-appearing, Fine needle aspiration performed with the 25 gauge needle total of 4 sample sent to cytology present on site with adequate specimens reported.  Pancreatic duct was not dilated.  No lymphadenopathy or vascular invasion was appreciated   Cytologic Interpretation   Pancreatic tail, fine-needle aspiration:   -Positive for malignant cells; consistent with adenocarcinoma      Ca19.9--148     2/2/24 CT chest  1. Enlarged AP window lymph node, indeterminate.  2. No discrete pulmonary nodules or masses.  3. Pancreatic tail neoplasm better seen on prior abdominal CT.    C1 2/12/24 FOLFIRINOX  Poorly tolerated, abd pain, diarrhea grade 3, recurrent diverticulitis requiring admission.    C1 3/4/24 [place]on/Abraxane, 3/12/24 [place]on/Abraxane C1 D8. Dose reduction due tho thrombocytopenia. Gemzar 750mg/m2 and Abraxane 100mg/m2, 3/18/24 [place]on/Abraxane C1 D15. Further dose reduction due to thrombocytopenia. Gemzar 750mg/m2 and Abraxane 75mg/m2.     4/1/24 [place]on/Abraxane C2 D1, full dose.  4/8/24 [place]on/Abraxane C2 D8, full dose.  4/10/24 ER visit, diverticulitis.  4/15/24 HOLD C2 D15 as she is still finishing Abx course.    Did not complete cycle 2.  Developed a breast abscess treated with one week of doxycycline.  Discussed with med onc her difficulty with chemotherapy and wishes to go forward with surgery.     PAST MEDICAL HISTORY:      Labial abscess                                                  Comment: Recurring    Hypertension                                                  Right hip pain                                                ASCUS with positive high risk HPV                              Sterilization                                   04/08/2013      Comment: Essure    Migraine                                                      Chest pain                                                    Diverticulitis                                                Anxiety                                                       Tobacco use                                                     Comment: 1/2PPD since age 17    JERMAIN (stress urinary incontinence, female)                       Comment: with sneezing and coughing     S/P colectomy                                   06/22/2018    Acute respiratory distress syndrome (ARDS)  (C* 06/24/2018      Comment: Early post op respiratory failure with ARDS                after surgery for diverticulitis    Peroneal DVT (deep venous thrombosis), left  (* 09/01/2020    Current use of long term anticoagulation        09/01/2020    Costochondritis                                 03/06/2019    Subacromial bursitis of left shoulder joint     02/07/2022    Dysphagia                                                     Pelvic pain in female                           05/31/2017    Chronic kidney disease                                        Abdominal cutaneous nerve entrapment syndrome   01/21/2019    Thyroid condition                                             Blood clot associated with vein wall inflammat*               Chronic pain                                                  History of adverse response to anesthesia                       Comment: ARDS after diverticulitis surgery    Sleep apnea                                                     Comment: lost  machine when  moving    Malignant neoplasm  (CMD)                                      PAST SURGICAL HISTORY:     ABSCESS DRAINAGE                                06/01/2011      Comment: Labia    ABSCESS DRAINAGE                                09/15/2011      Comment: Labia    INSERT INTRAUTERINE DEVICE                       03/06/2009      Comment: Mirena removed    PAP SMEAR                                       01/17/2013    SALPINGECTOMY                                   08/15/2013      Comment: Laparoscopic left salpingectomy    TUBAL LIGATION                                                PELVIC LAPAROSCOPY                                            COLONOSCOPY                                     02/24/2017      Comment: Repeat 2/2022 Dr. Khan    ESOPHAGOGASTRODUODENOSCOPY                      02/24/2017      Comment: Dr. Khan    COLONOSCOPY                                     02/24/2017      Comment: repeat due 2-2022    UPPER GASTROINTESTINAL ENDOSCOPY                02/24/2017    LAPAROSCOPIC SUPRACERVICAL HYSTERECTOMY         05/31/2017      Comment: Dr. Dean: Single Incision Laparoscopic                Supracervical Hysterectomy with Right                Salpingectomy     COLONOSCOPY                                     11/02/2017      Comment: repeat 5 years    SALPINGECTOMY                                   05/31/2017      Comment: Dr. Dean    HYSTEROSCPY TUBAL OCCLUSION W/IMPLNT                            Comment: essure    OVARIAN CYST REMOVAL                            06/22/2018      Comment: Laparoscopic left ovarian cyst excision,                drainage of simple left ovarian cyst    COLECTOMY                                       06/22/2018      Comment: Lap coverted to open sigmoid resection; Radha Rene MD    LAPAROSCOPY MOBIL SPLEN FLEX                    06/22/2018      Comment: Radha Rene MD    COLONOSCOPY DIAGNOSTIC                          09/28/2021      Comment: Dr. Bowling    ANKLE SURGERY                                                 HYSTERECTOMY - CERVIX REMOVED                   2017            Comment: Single Incision Laparoscopic Supracervical                Hysterectomy with Right Salpingectomy      ALLERGIES:  ALLERGIES:   Allergen Reactions    Propofol  Other (See Comments)     ZAIDA (propofol infusion syndrome)- bradycardia and hypotension    Seasonal Other (See Comments)    Sulfa Antibiotics PRURITUS    Adhesive   (Environmental) RASH and PRURITUS     Tape, Bandaid     Clindamycin RASH and PRURITUS    Droperidol Other (See Comments)     Dystonic reaction per ICU documentation    Duricef [Cefadroxil] RASH     Full body rash/itching tolerates augmentin per her report with no reaction    Morphine Sulfate PRURITUS and Other (See Comments)     FLUSHING    Reglan Other (See Comments)     Dystonic reaction per ICU summary    Toradol RASH        MEDICATIONS:  Current Outpatient Medications   Medication Sig Dispense Refill    MAGIC (antacid-diphenhydramine-lidocaine) 1:1:1 MOUTHWASH oral susp Swish and spit 15 mLs 4 times daily (before meals and nightly). 300 mL 0    magnesium citrate 1.745 GM/30ML Solution [None received]      Sennosides (Senna) 8.6 MG Cap [None received] (Patient not taking: Reported on 5/2/2024)      oxyCODONE-acetaminophen (PERCOCET) 5-325 MG per tablet Take 1 tablet by mouth every 6 hours as needed for Pain. 60 tablet 0    OLANZapine (ZyPREXA) 5 MG tablet Take 1 tablet by mouth nightly. Indications: Nausea and Vomiting caused by Cancer Chemotherapy 30 tablet 0    sennosides-docusate sodium (SENOKOT-S) 8.6-50 MG tablet Take 2 tablets by mouth daily. Indications: Constipation (Patient not taking: Reported on 5/2/2024) 30 tablet 11    topiramate (TOPAMAX) 50 MG tablet TAKE 1 TABLET BY MOUTH DAILY WITH 100 MG TO EQUAL 150 MG 90 tablet 3    topiramate (TOPAMAX) 100 MG tablet TAKE 1 TABLET BY MOUTH DAILY WITH 50 MG TABLET 90 tablet 3    carvedilol (Coreg) 25 MG tablet Take 1 tablet by mouth in the morning and 1 tablet in the evening. Take with meals. 180 tablet 3    amLODIPine (NORVASC) 5 MG tablet Take 1 tablet by mouth daily. 90 tablet 3    sertraline (ZOLOFT) 100 MG tablet Take 1 and a HALF tablets by mouth daily. 135 tablet 3    cloNIDine (CATAPRES) 0.2  MG tablet Take a HALF tablet by mouth every 12 hours. 90 tablet 1    buPROPion XL (WELLBUTRIN XL) 150 MG 24 hr tablet Take 1 tablet by mouth daily. 90 tablet 3    atorvastatin (LIPITOR) 40 MG tablet Take 1 tablet by mouth nightly. 90 tablet 3    hydrOXYzine (ATARAX) 25 MG tablet Take 1 tablet by mouth 3 times daily as needed for Anxiety. 90 tablet 0    albuterol 108 (90 Base) MCG/ACT inhaler Inhale 2 puffs into the lungs every 4 hours as needed for Shortness of Breath or Wheezing. 8.5 g 0    apixaBAN (ELIQUIS) 5 MG Tab Take 1 tablet by mouth every 12 hours. 60 tablet 3    triamcinolone (ARISTOCORT) 0.1 % cream Apply topically 2 times daily. 30 g 0    diphenoxylate-atropine (LOMOTIL) 2.5-0.025 MG tablet Take 1 tablet by mouth 4 times daily as needed for Diarrhea. 16 tablet 0    acetaminophen (TYLENOL) 500 MG tablet Take 2 tablets by mouth every 6 hours as needed for Pain.      ondansetron (ZOFRAN) 4 MG tablet Take 1 tablet by mouth every 8 hours as needed for Nausea. 20 tablet 0    lidocaine-prilocaine (EMLA) 2.5-2.5 % cream Apply to Mediport site 1 hour prior to access procedure 30 g 1    prochlorperazine (COMPAZINE) 10 MG tablet Take 1 tablet by mouth every 6 hours as needed for Nausea or Vomiting. 30 tablet 5    loperamide (IMODIUM) 2 MG capsule Take 4 mg by mouth at first loose stool, then 2 mg every 2 hours until diarrhea-free for 12 hours. (Patient not taking: Reported on 5/2/2024) 30 capsule 6    pantoprazole (PROTONIX) 20 MG tablet Take 1 tablet by mouth in the morning and 1 tablet in the evening. 180 tablet 1    sucralfate (Carafate) 1 g tablet Take 1 tablet by mouth 4 times daily. 28 tablet 0    Cholecalciferol (vitamin D3) 125 mcg (5,000 units) capsule Take 1 capsule by mouth every other day. Not more than 3 pills/week 90 capsule 3    Ubrogepant (Ubrelvy) 100 MG Tab Take 100 mg by mouth as needed (migraine). Take at onset of migraine may repeat in 2 hours if needed Max of 2 in 24 hours 8 tablet 3     No  current facility-administered medications for this visit.   '    ANESTHESIA CONSIDERATIONS:  No family or personal history of malignant hyperthermia or pseudocholinesterase deficiency    FAMILY HISTORY:  No family history of venous thromboembolism  No family history of bleeding disorders    PRE-OPERATIVE CAPRINI SCORE:   She is on AC for personal hx of DVT/PE in     SOCIAL HISTORY  Alcohol: no  Drugs: no  Tobacco: yes, 1/2 ppd since the age of 17    PONV RISK ASSESSMENT (modified Apfel score):  Female: 1  Non-smoker: 0  Hx of OPNV or motion sickness:0  Postop opioid use: 1  Total:2  Risk: mild    RAD RISK ASSESSMENT:  Moderate risk factors: yes, GFR 50-70, anemia  Major risk factors: no    REVIEW OF SYSTEMS:   GENERAL:  Denies fever, chills, night sweats, anorexia, fatigue or change in weight.  EYES:  Denies blurring, diplopia, or change in vision.  ENT:  Denies ear pain, nasal discharge, nasal congestion, epistaxis, sore throat, hoarseness or dysphagia.  LUNGS:  Denies shortness of breath, cough, wheeze, increased sputum production or hemoptysis.   Sleep apnea diagnosed with sleep apnea but does not use a machine  Tobacco dependence  Hx of PE   CARDIAC:  Denies recent chest pain, palpitations, dyspnea, edema or syncope.  Lifelong murmur per pt report (saw cardiology in 2012 for same)  ECHO in 2018 normal  Hx of HTN  Exercise: no scheduled program, is working  Functional Capacity: >4 METS  EK/10/24 NSR  GASTROINTESTINAL:  denies change in bowel habits, stool size or consistency.  No nausea, vomiting, melena or hematochezia.  Sigmoid resection for acute diverticular disease 2018  Colonoscopy:   1. Transverse colon polyp sessile 8 mm removed by snare polypectomy  2. A few scattered left-sided diverticula without inflammatory changes.   3. Patent sigmoid anastomosis at 25 cm from the anal verge  4. Normal appearance of the colon mucosa and terminal ileum otherwise, biopsies obtained    GENITOURINARY:   Denies dysuria, hematuria, or frequency. Stress incontinence  CKD stage III  ?renal artery stenosis and RAD after taking lisinopril mentioned in record (cannot find renal US report)  MUSCULOSKELETAL:  Denies arthralgias or myalgias.  NEUROLOGICAL:  Denies dizziness, headache, lightheadedness, or loss of consciousness.  ENDOCRINE:  Denies heat or cold intolerance.  HEMATOLOGIC: Denies easy bleeding or brusing anemia and thrombocytopenia thru chemotherapy  SKIN:  Denies rash.    PHYSICAL EXAM:    General:  Patient is awake, alert, and in no acute distress.  HEENT:  Normocephalic, atraumatic.  No scleral icterus, extraocular movements intact, pharynx is non-erythematous, non-edematous, nares patent.  No adenopathy no thyromegaly.  Chest:  Clear to auscultation bilaterally.  No wheezes, crackles, rhonchi, or accessory muscle usage.   Cardiac:  Regular rate and rhythm without appreciable murmur.  Abdomen:  Soft, nondistended, nontender, normoactive bowel sounds, no palpable masses.  Extremities:  No edema or cyanosis.  Vascular:  2+ radial and dorsalis pedis pulses bilaterally.   Neurologic:  Grossly normal, sensory and motor intact.  Skin:  Without rashes or jaundice.      ASSESSMENT:  #Mood disorder, anxiety, PTSD-on wellubrin, zoloft  #Hx of DVT/PE-in 2022, is on lifelong eliquis  #HLD-on atorvastatin  #HTN-on clonidine, norvasc, and coreg.  #CKD (GFR 50-70) mention of renal artery stenosis in PCP note (cannot find US of kidneys that is referenced)  #tobacco dependence-trying to decrease  #EZE was on CPAP but lost the machine, might need while hospitalized  #migraines -on daily topamax with ubrelvy as prn medication  #chronic pain-had tried gabapentin and lyrica without effect, has been on percocet   #GERD-PPI, prn carafate  #diverticulitis s/p open sigmoid colon resection, has had episodes of inflammation at anastamosis, ?residual diverticula  # pancreas cancer s/p 1 cycle of FOLFIRINOX and 2 cycles of gemzar and  maribeth  Planned Surgery:   Dx lap, open whipple  Surgical Risks:  HTN, CKD, Tobacco dependence, hx of clot  ASA Level: II            Risk Mitigation/recommendations:  -restart wellbutrin and zoloft ASAP, when able to take in orally.  -restart AC ASAP, could do weight based lovenox vs heparin gtt as appropriate  -start IV metoprolol pre op, restart BP meds as needed/able  -RAD pre/post order sets. Favor fluid resuscitation for hypotension  -pulm toilet, assess need for CPAP while inpatient  -may have pain management issues given chronic percocet use, consult pain management if needed.   -PT/OT    Marialuisa Goldberg NP

## 2024-10-05 SDOH — HEALTH STABILITY: PHYSICAL HEALTH: ON AVERAGE, HOW MANY DAYS PER WEEK DO YOU ENGAGE IN MODERATE TO STRENUOUS EXERCISE (LIKE A BRISK WALK)?: 5 DAYS

## 2024-10-05 SDOH — HEALTH STABILITY: PHYSICAL HEALTH: ON AVERAGE, HOW MANY MINUTES DO YOU ENGAGE IN EXERCISE AT THIS LEVEL?: 20 MIN

## 2024-10-05 ASSESSMENT — SOCIAL DETERMINANTS OF HEALTH (SDOH): HOW OFTEN DO YOU GET TOGETHER WITH FRIENDS OR RELATIVES?: ONCE A WEEK

## 2024-10-08 ENCOUNTER — OFFICE VISIT (OUTPATIENT)
Dept: FAMILY MEDICINE | Facility: CLINIC | Age: 34
End: 2024-10-08
Attending: FAMILY MEDICINE
Payer: COMMERCIAL

## 2024-10-08 ENCOUNTER — PATIENT OUTREACH (OUTPATIENT)
Dept: ONCOLOGY | Facility: CLINIC | Age: 34
End: 2024-10-08

## 2024-10-08 VITALS
RESPIRATION RATE: 15 BRPM | DIASTOLIC BLOOD PRESSURE: 78 MMHG | SYSTOLIC BLOOD PRESSURE: 108 MMHG | BODY MASS INDEX: 31.54 KG/M2 | OXYGEN SATURATION: 97 % | WEIGHT: 208.1 LBS | HEART RATE: 86 BPM | TEMPERATURE: 97.5 F | HEIGHT: 68 IN

## 2024-10-08 DIAGNOSIS — E66.811 CLASS 1 OBESITY DUE TO EXCESS CALORIES WITHOUT SERIOUS COMORBIDITY WITH BODY MASS INDEX (BMI) OF 33.0 TO 33.9 IN ADULT: ICD-10-CM

## 2024-10-08 DIAGNOSIS — Z80.8 FAMILY HISTORY OF SKIN CANCER: ICD-10-CM

## 2024-10-08 DIAGNOSIS — R73.09 ELEVATED GLUCOSE: ICD-10-CM

## 2024-10-08 DIAGNOSIS — N52.9 ERECTILE DYSFUNCTION, UNSPECIFIED ERECTILE DYSFUNCTION TYPE: ICD-10-CM

## 2024-10-08 DIAGNOSIS — Z00.00 ROUTINE GENERAL MEDICAL EXAMINATION AT A HEALTH CARE FACILITY: Primary | ICD-10-CM

## 2024-10-08 DIAGNOSIS — Z80.9 FAMILY HISTORY OF CANCER: ICD-10-CM

## 2024-10-08 DIAGNOSIS — E66.09 CLASS 1 OBESITY DUE TO EXCESS CALORIES WITHOUT SERIOUS COMORBIDITY WITH BODY MASS INDEX (BMI) OF 33.0 TO 33.9 IN ADULT: ICD-10-CM

## 2024-10-08 DIAGNOSIS — Z13.220 LIPID SCREENING: ICD-10-CM

## 2024-10-08 LAB
CHOLEST SERPL-MCNC: 158 MG/DL
EST. AVERAGE GLUCOSE BLD GHB EST-MCNC: 114 MG/DL
FASTING STATUS PATIENT QL REPORTED: YES
HBA1C MFR BLD: 5.6 % (ref 0–5.6)
HDLC SERPL-MCNC: 31 MG/DL
LDLC SERPL CALC-MCNC: 89 MG/DL
NONHDLC SERPL-MCNC: 127 MG/DL
TRIGL SERPL-MCNC: 189 MG/DL

## 2024-10-08 PROCEDURE — 90471 IMMUNIZATION ADMIN: CPT | Performed by: FAMILY MEDICINE

## 2024-10-08 PROCEDURE — 90656 IIV3 VACC NO PRSV 0.5 ML IM: CPT | Performed by: FAMILY MEDICINE

## 2024-10-08 PROCEDURE — 91320 SARSCV2 VAC 30MCG TRS-SUC IM: CPT | Performed by: FAMILY MEDICINE

## 2024-10-08 PROCEDURE — 36415 COLL VENOUS BLD VENIPUNCTURE: CPT | Performed by: FAMILY MEDICINE

## 2024-10-08 PROCEDURE — 90480 ADMN SARSCOV2 VAC 1/ONLY CMP: CPT | Performed by: FAMILY MEDICINE

## 2024-10-08 PROCEDURE — 83036 HEMOGLOBIN GLYCOSYLATED A1C: CPT | Performed by: FAMILY MEDICINE

## 2024-10-08 PROCEDURE — 99395 PREV VISIT EST AGE 18-39: CPT | Mod: 25 | Performed by: FAMILY MEDICINE

## 2024-10-08 PROCEDURE — 80061 LIPID PANEL: CPT | Performed by: FAMILY MEDICINE

## 2024-10-08 ASSESSMENT — PAIN SCALES - GENERAL: PAINLEVEL: NO PAIN (0)

## 2024-10-08 NOTE — PATIENT INSTRUCTIONS
Patient Education   If you are interested, there is a new continuous blood sugar monitor available over-the-counter from [employer] called Propable. You can check out their website [email or web address].[email or web address] for more information. [employer] will also have an over-the-counter continuous blood sugar monitor available soon.   Consider visiting with the cancer   Add strength training at least twice weekly.   Let me know if you want refills of the cialis.   Preventive Care Advice   This is general advice given by our system to help you stay healthy. However, your care team may have specific advice just for you. Please talk to your care team about your preventive care needs.  Nutrition  Eat 5 or more servings of fruits and vegetables each day.  Try wheat bread, brown rice and whole grain pasta (instead of white bread, rice, and pasta).  Get enough calcium and vitamin D. Check the label on foods and aim for 100% of the RDA (recommended daily allowance).  Lifestyle  Exercise at least 150 minutes each week  (30 minutes a day, 5 days a week).  Do muscle strengthening activities 2 days a week. These help control your weight and prevent disease.  No smoking.  Wear sunscreen to prevent skin cancer.  Have a dental exam and cleaning every 6 months.  Yearly exams  See your health care team every year to talk about:  Any changes in your health.  Any medicines your care team has prescribed.  Preventive care, family planning, and ways to prevent chronic diseases.  Shots (vaccines)   HPV shots (up to age 26), if you've never had them before.  Hepatitis B shots (up to age 59), if you've never had them before.  COVID-19 shot: Get this shot when it's due.  Flu shot: Get a flu shot every year.  Tetanus shot: Get a tetanus shot every 10 years.  Pneumococcal, hepatitis A, and RSV shots: Ask your care team if you need these based on your risk.  Shingles shot (for age 50 and up)  General health tests  Diabetes screening:  Starting at age 35, Get screened for  diabetes at least every 3 years.  If you are younger than age 35, ask your care team if you should be screened for diabetes.  Cholesterol test: At age 39, start having a cholesterol test every 5 years, or more often if advised.  Bone density scan (DEXA): At age 50, ask your care team if you should have this scan for osteoporosis (brittle bones).  Hepatitis C: Get tested at least once in your life.  STIs (sexually transmitted infections)  Before age 24: Ask your care team if you should be screened for STIs.  After age 24: Get screened for STIs if you're at risk. You are at risk for STIs (including HIV) if:  You are sexually active with more than one person.  You don't use condoms every time.  You or a partner was diagnosed with a sexually transmitted infection.  If you are at risk for HIV, ask about PrEP medicine to prevent HIV.  Get tested for HIV at least once in your life, whether you are at risk for HIV or not.  Cancer screening tests  Cervical cancer screening: If you have a cervix, begin getting regular cervical cancer screening tests starting at age 21.  Breast cancer scan (mammogram): If you've ever had breasts, begin having regular mammograms starting at age 40. This is a scan to check for breast cancer.  Colon cancer screening: It is important to start screening for colon cancer at age 45.  Have a colonoscopy test every 10 years (or more often if you're at risk) Or, ask your provider about stool tests like a FIT test every year or Cologuard test every 3 years.  To learn more about your testing options, visit:   .  For help making a decision, visit:   https://bit.ly/mp59506.  Prostate cancer screening test: If you have a prostate, ask your care team if a prostate cancer screening test (PSA) at age 55 is right for you.  Lung cancer screening: If you are a current or former smoker ages 50 to 80, ask your care team if ongoing lung cancer screenings are right for you.  For informational purposes only. Not to  replace the advice of your health care provider. Copyright   2023 [facility]. All rights reserved. Clinically reviewed by the [facility] Transitions Program. Setred 742419 - REV 01/24.

## 2024-10-08 NOTE — PROGRESS NOTES
New Patient Oncology Nurse Navigator Note     Referring provider: Camila Calderon MDSphp [facility]     Referred to (specialty):  Genetic Counseling    Date Referral Received: 10/8/2024     Evaluation for: Family history of skin cancer     He had a skin exam with derm in 4/24 and was told to return in 2-3 years.   Grandfather with melanoma history and sister with history of precancerous skin lesion.

## 2024-10-08 NOTE — RESULT ENCOUNTER NOTE
Excellent! Please call or send a AYLIEN message if you have any questions. Camila Calderon M.D.

## 2024-10-08 NOTE — PROGRESS NOTES
"Preventive Care Visit  [facility]  Camila Calderon MD, Family Medicine  Oct 8, 2024      Assessment & Plan       ICD-10-CM    1. Routine general medical examination at a health care facility  Z00.00       2. Elevated glucose  R73.09 Hemoglobin A1c      3. Lipid screening  Z13.220 Lipid panel reflex to direct LDL Fasting      4. Family history of cancer  Z80.9 Adult Oncology/Hematology  Referral         Routine preventive visit    Lipid and glucose screening today, given fhx HLD and obesity  COVID and Flu vaccine recommended today  Dietary changes for weight loss discussed.    Family history of breast cancer in his mom, melanoma in PGM. -- offered cancer genetics referral today         History of Adjustment disorder with mixed anxiety and depressed mood  Improved. He tapered off lexapro last summer and is doing well.          Family history of skin cancer    He had a skin exam with derm in 4/24 and was told to return in 2-3 years.   Grandfather with melanoma history and sister with history of precancerous skin lesion.       Class 1 obesity  Continues to lose weight.    Working on improving diet. Stopped drinking soda.    Got back into running. Cut back on alcohol and junk food and sugary drinks.         Erectile dysfunction  Saw Urology last year and was prescribed cialis 5mg daily and [facility] with the hope that with some counseling he would be able to discontinue the cialist. I offered refills of cialis in the future if needed. It is working well for him. The [facility] was not taking new patients at the time that he called.                   BMI  Estimated body mass index is 31.88 kg/m  as calculated from the following:    Height as of this encounter: 1.721 m (5' 7.75\").    Weight as of this encounter: 94.4 kg (208 lb 1.6 oz).   Weight management plan: as above    Counseling  Appropriate preventive services " were addressed with this patient via screening, questionnaire, or discussion as appropriate for fall prevention, nutrition, physical activity, Tobacco-use cessation, social engagement, weight loss and cognition.  Checklist reviewing preventive services available has been given to the patient.  Reviewed patient's diet, addressing concerns and/or questions.   He is at risk for psychosocial distress and has been provided with information to reduce risk.          Subjective   Pat is a 33 year old, presenting for the following:  Physical and Imm/Inj (Covid/ Flu )        10/8/2024     7:41 AM   Additional Questions   Roomed by Mando Rene   Accompanied by Self        Health Care Directive  Patient does not have a Health Care Directive or Living Will: Patient states has Advance Directive and will bring in a copy to clinic.    HPI  Doing well overall    He and his wife are attempting to conceive again. She was pregnant in April, but had a miscarriage.   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   Cialis works well        10/5/2024   General Health   How would you rate your overall physical health? Good   Feel stress (tense, anxious, or unable to sleep) Only a little      (!) STRESS CONCERN      10/5/2024   Nutrition   Three or more servings of calcium each day? Yes   Diet: Regular (no restrictions)   How many servings of fruit and vegetables per day? (!) 2-3   How many sweetened beverages each day? 0-1            10/5/2024   Exercise   Days per week of moderate/strenous exercise 5 days   Average minutes spent exercising at this level 20 min            10/5/2024   Social Factors   Frequency of gathering with friends or relatives Once a week   Worry food won't last until get money to buy more No   Food not last or not have enough money for food? No   Do you have housing? (Housing is defined as stable permanent housing and does not include staying ouside in a car, in a tent, in an abandoned building, in an overnight  "shelter, or couch-surfing.) Yes   Are you worried about losing your housing? No   Lack of transportation? No   Unable to get utilities (heat,electricity)? No            10/5/2024   Dental   Dentist two times every year? Yes            10/5/2024   TB Screening   Were you born outside of the [place]? No            Today's PHQ-2 Score:       10/7/2024     9:03 AM   PHQ-2 ( 1999 [employer])   Q1: Little interest or pleasure in doing things 0   Q2: Feeling down, depressed or hopeless 0   PHQ-2 Score 0   Q1: Little interest or pleasure in doing things Not at all   Q2: Feeling down, depressed or hopeless Not at all   PHQ-2 Score 0           10/5/2024   Substance Use   Alcohol more than 3/day or more than 7/wk No   Do you use any other substances recreationally? No        Social History     Tobacco Use    Smoking status: Never    Smokeless tobacco: Never   Vaping Use    Vaping status: Never Used   Substance Use Topics    Alcohol use: Yes     Comment: weekly social drinking    Drug use: Never           10/5/2024   STI Screening   New sexual partner(s) since last STI/HIV test? No            10/5/2024   Contraception/Family Planning   Questions about contraception or family planning No           Reviewed and updated as needed this visit by Provider                    Past Medical History:   Diagnosis Date    NO ACTIVE PROBLEMS      Past Surgical History:   Procedure Laterality Date    no prior surgeries              Objective    Exam  /78   Pulse 86   Temp 97.5  F (36.4  C) (Temporal)   Resp 15   Ht 1.721 m (5' 7.75\")   Wt 94.4 kg (208 lb 1.6 oz)   SpO2 97%   BMI 31.88 kg/m     Estimated body mass index is 31.88 kg/m  as calculated from the following:    Height as of this encounter: 1.721 m (5' 7.75\").    Weight as of this encounter: 94.4 kg (208 lb 1.6 oz).    Physical Exam  GENERAL: alert and no distress  EYES: Eyes grossly normal to inspection, PERRL and conjunctivae and sclerae normal  HENT: ear canals and TM's normal, " nose and mouth without ulcers or lesions  NECK: no adenopathy, no asymmetry, masses, or scars  RESP: lungs clear to auscultation - no rales, rhonchi or wheezes  CV: regular rate and rhythm, normal S1 S2, no S3 or S4, no murmur, click or rub, no peripheral edema  ABDOMEN: soft, nontender, no hepatosplenomegaly, no masses and bowel sounds normal  MS: no gross musculoskeletal defects noted, no edema  SKIN: no suspicious lesions or rashes  NEURO: Normal strength and tone, mentation intact and speech normal  PSYCH: mentation appears normal, affect normal/bright        Signed Electronically by: Camila Calderon MD, MD

## 2024-10-09 NOTE — RESULT ENCOUNTER NOTE
"The results of your recent lipid (cholesterol) profile were improved.    Here are the results:  Lab Results       Component                Value               Date                       CHOL                     158                 10/08/2024                 CHOL                     158                 05/05/2021            Lab Results       Component                Value               Date                       HDL                      31                  10/08/2024                 HDL                      53                  05/05/2021            Lab Results       Component                Value               Date                       LDL                      89                  10/08/2024                 LDL                      90                  05/05/2021            Lab Results       Component                Value               Date                       TRIG                     189                 10/08/2024                 TRIG                     75                  05/05/2021            No results found for: \"CHOLHDLRATIO\"    Desired or goal levels are:  CHOLESTEROL: Desirable is less than 200.   HDL (Good Cholesterol): Desirable is greater than 40 (for men) greater than 50 (for women).  LDL (Bad Cholesterol): Desirable is less than 130 (or less than 100 if you have heart disease or diabetes). Borderline 130-160.  TRIGLYCERIDES: Desirable is less than 150.  Borderline is 150-200.    For high triglycerides, reduce the intake of jam, jelly, honey, alcohol, and/or coffee creamers  Your HDL (the good cholesterol) level is low, no medication is required at this point. Reducing your total fat intake, increasing exercise level, reducing weight, adding olive oil to your diet, etc, may help to increase this level..    To help lower your LDL (bad cholesterol) you could start adding ground flax seed to your diet. The recommended dose is 2 heaping tablespoonfuls daily, you may want to start with 1 tablespoonful and " increase to 2 heaping tablespoonfuls. You can mix or add ground flax seed to hot cereals, yogurt, applesauce, cottage cheese or any sauce mixture that is your portion. Ground flax seed can be found in the baking aisle near the flour.      As you may know, an elevated cholesterol is one factor that increases your risk for heart disease and stroke. You can improve your cholesterol by controlling the amount and type of fat you eat and by increasing your daily activity level.    Here are some ways to improve your nutrition:  Eat less fat (especially butter, Crisco and other saturated fats)  Buy lean cuts of meat, reduce your portions of red meat or substitute poultry or fish  Use skim milk and low-fat dairy products  Eat no more than 4 egg yolks per week  Avoid fried or fast foods that are high in fat  Eat more fruits and vegetables      Also consider starting or increasing your aerobic activity. Aerobic activity is the best way to improve HDL (good) cholesterol. If this would be new to you, please talk with me first about what activities are safe for you.      Other lab results:    Your hemoglobin A1C (a measure of your blood sugar over the past 3 months) was normal.        Please feel free to contact us with any questions or if you would like more information.      Camila Calderon M.D.

## 2025-01-02 ENCOUNTER — MYC MEDICAL ADVICE (OUTPATIENT)
Dept: FAMILY MEDICINE | Facility: CLINIC | Age: 35
End: 2025-01-02
Payer: COMMERCIAL

## 2025-01-02 DIAGNOSIS — Z31.41 VISIT FOR SEMEN ANALYSIS: Primary | ICD-10-CM

## 2025-01-10 NOTE — TELEPHONE ENCOUNTER
Dr. Calderon: pended referral    My wife and I have now hit 6 months of trying for another baby without success (we re both over 35). I was wondering about doing a semen analysis. Are you able to put in an order for that, or do I need a referral?     NATI Grayson, BSN, PHN, RN-[facility]  [phone]

## 2025-01-13 NOTE — TELEPHONE ENCOUNTER
Writer responded via [employer].  KAMERON LarkinN, RN-BC  [facility]

## 2025-01-20 ENCOUNTER — MYC MEDICAL ADVICE (OUTPATIENT)
Dept: FAMILY MEDICINE | Facility: CLINIC | Age: 35
End: 2025-01-20
Payer: COMMERCIAL

## 2025-01-20 DIAGNOSIS — Z31.69 INFERTILITY COUNSELING: Primary | ICD-10-CM

## 2025-01-20 NOTE — TELEPHONE ENCOUNTER
Dr Calderon,    This RN not clear what type of referral needed . Pt writes:    We are currently interviewing fertility clinics. We have fertility benefits through [facility], but the coverage requires a referral from Primary Care. We are meeting with [facility] ([facility]) and [facility] to determine who we d like to work with. Both are in-network.      [employer] wants a referral for both myself and my wife (she s working with her primary for that). They asked that the referral go through the [employer] system.      I do have an urology appointment through [facility] scheduled for February, but would love to get in with the fertility clinics as quickly as possible.     Thanks and let me know if you have any questions!

## 2025-01-21 NOTE — TELEPHONE ENCOUNTER
Found information on google, updated referral review and sign if agreeable    [employer] is portal through [facility] our referral team handles insurance referrals so once signed and received back to primary pool route to referral team-thanks!

## 2025-01-21 NOTE — TELEPHONE ENCOUNTER
I placed a [facility] referral. I do not see an address for the [facility] in our referral options. Please get the full address and phone number from Pat and I can enter that into another referral for him for [facility]. We do not have a way to connect with the [employer] system that I am aware of (I have not heard of it). Camila Calderon M.D.

## 2025-01-22 ENCOUNTER — OFFICE VISIT (OUTPATIENT)
Dept: OPTOMETRY | Facility: CLINIC | Age: 35
End: 2025-01-22
Payer: COMMERCIAL

## 2025-01-22 DIAGNOSIS — H52.229 MYOPIA WITH REGULAR ASTIGMATISM: Primary | ICD-10-CM

## 2025-01-22 DIAGNOSIS — H52.10 MYOPIA WITH REGULAR ASTIGMATISM: Primary | ICD-10-CM

## 2025-01-22 ASSESSMENT — REFRACTION_CURRENTRX
OD_BASECURVE: 8.8
OS_BASECURVE: 8.4
OS_SPHERE: -2.50
OS_BASECURVE: 8.8
OD_BRAND: J&J ACUVUE OASYS
OD_SPHERE: -2.50
OD_SPHERE: -2.00
OS_SPHERE: -2.00
OS_DIAMETER: 14.0
OD_CYLINDER: SPHERE
OD_BRAND: ACUVUE OASYS
OS_CYLINDER: SPHERE
OS_BRAND: ACUVUE OASYS
OS_DIAMETER: 14.0
OD_DIAMETER: 14.0
OD_DIAMETER: 14.0
OD_BASECURVE: 8.4
OS_BRAND: J&J ACUVUE OASYS

## 2025-01-22 ASSESSMENT — CONF VISUAL FIELD
OS_INFERIOR_NASAL_RESTRICTION: 0
OS_NORMAL: 1
OD_INFERIOR_TEMPORAL_RESTRICTION: 0
METHOD: COUNTING FINGERS
OD_NORMAL: 1
OS_SUPERIOR_TEMPORAL_RESTRICTION: 0
OS_INFERIOR_TEMPORAL_RESTRICTION: 0
OD_INFERIOR_NASAL_RESTRICTION: 0
OD_SUPERIOR_TEMPORAL_RESTRICTION: 0
OS_SUPERIOR_NASAL_RESTRICTION: 0
OD_SUPERIOR_NASAL_RESTRICTION: 0

## 2025-01-22 ASSESSMENT — REFRACTION
OS_CYLINDER: +0.50
OD_AXIS: 010
OD_CYLINDER: +0.25
OD_CYLINDER: SPHERE
OD_SPHERE: -1.75
OS_AXIS: 018
OS_SPHERE: -2.25
OS_SPHERE: -2.00
OS_CYLINDER: +0.75
OD_SPHERE: -1.75
OS_AXIS: 010

## 2025-01-22 ASSESSMENT — SLIT LAMP EXAM - LIDS
COMMENTS: TR BLEPH
COMMENTS: TR BLEPH

## 2025-01-22 ASSESSMENT — REFRACTION_WEARINGRX
OS_SPHERE: -2.25
SPECS_TYPE: SVL
OD_SPHERE: -2.00
OS_AXIS: 090
OD_CYLINDER: SPHERE
OS_CYLINDER: +0.25

## 2025-01-22 ASSESSMENT — VISUAL ACUITY
OD_CC: 20/20
CORRECTION_TYPE: GLASSES
OS_CC: 20/20
METHOD: SNELLEN - LINEAR

## 2025-01-22 ASSESSMENT — TONOMETRY
IOP_METHOD: ICARE
OD_IOP_MMHG: 18
OS_IOP_MMHG: 19

## 2025-01-22 ASSESSMENT — REFRACTION_MANIFEST
OS_SPHERE: -1.75
OD_CYLINDER: SPHERE
OD_SPHERE: -1.50
OS_CYLINDER: SPHERE

## 2025-01-22 ASSESSMENT — CUP TO DISC RATIO
OD_RATIO: 0.35
OS_RATIO: 0.25

## 2025-01-22 NOTE — PROGRESS NOTES
A/P  1.) Myopia/Astigmatism OU  -Largely stable spec Rx. Mainly in glasses, rare CL wear  -Overminused in CL's, can drop quite a bit. Prefers to keep same Rx OU  -Dilated ocular health unremarkable OU    Monitor 1-2 years comprehensive, sooner prn    I have confirmed the patient's CC, HPI and reviewed Past Medical History, Past Surgical History, Social History, Family History, Problem List, Medication List and agree with Tech note.     Vicki Garrett, OD FAAO FSLS

## 2025-01-22 NOTE — NURSING NOTE
Chief Complaints and History of Present Illnesses   Patient presents with    Annual Eye Exam     Pt here for new adult annual eye exam with contacts.      Chief Complaint(s) and History of Present Illness(es)       Annual Eye Exam              Laterality: both eyes    Comments: Pt here for new adult annual eye exam with contacts.               Comments    Pt last eye exam was in 2021 [facility]. Currently out of contacts. Pt has largely unchanged vision since last exam. No new concerns. Hx of eye infection from sleeping in contacts.     Overall healthy eyes. No family hx of eye issues.     MAURI Ha on 1/22/2025 at 7:36 AM                      patient

## 2025-01-23 DIAGNOSIS — N46.9 MALE INFERTILITY: Primary | ICD-10-CM

## 2025-01-29 ENCOUNTER — TRANSFERRED RECORDS (OUTPATIENT)
Dept: HEALTH INFORMATION MANAGEMENT | Facility: CLINIC | Age: 35
End: 2025-01-29

## 2025-03-12 ENCOUNTER — VIRTUAL VISIT (OUTPATIENT)
Dept: UROLOGY | Facility: CLINIC | Age: 35
End: 2025-03-12
Payer: COMMERCIAL

## 2025-03-12 DIAGNOSIS — N52.9 ERECTILE DYSFUNCTION, UNSPECIFIED ERECTILE DYSFUNCTION TYPE: Primary | ICD-10-CM

## 2025-03-12 PROCEDURE — 98005 SYNCH AUDIO-VIDEO EST LOW 20: CPT | Performed by: PHYSICIAN ASSISTANT

## 2025-03-12 RX ORDER — TADALAFIL 5 MG/1
5 TABLET ORAL EVERY 24 HOURS
Qty: 90 TABLET | Refills: 4 | Status: SHIPPED | OUTPATIENT
Start: 2025-03-12

## 2025-03-12 NOTE — PROGRESS NOTES
How would you like to obtain your AVS? MyChart  If the video visit is dropped, the invitation should be resent by: Text to cell phone: [phone]  Will anyone else be joining your video visit? No          Video-Visit Details    Type of service:  Video Visit     Originating Location (pt. Location): Home    Distant Location (provider location):  On-site  Platform used for Video Visit: [employer]  Start time: 1:52pm  End time: 2:00pm    CC: ED     HPI:  Stas Hollis is a very pleasant 34 year old male who presents in follow-up of the above.     Last seen 12/5/2023: Post covid in Aug noted to have some issue with maintaining an erection at times. Has good libido and does not seem to be an issue. Can't help think about performance ad if it will be successful to end with orgasm.   Pre-DM noted.    TODAY 3/11/25:  Has been on daily Cialis 5mg. Working well.   [facility]   Cancelled appt with Dr. Gonzalez re: infertility. Wife became pregnant!       Past Medical History:   Diagnosis Date    NO ACTIVE PROBLEMS        Past Surgical History:   Procedure Laterality Date    no prior surgeries         Social History     Socioeconomic History    Marital status:      Spouse name: Not on file    Number of children: Not on file    Years of education: Not on file    Highest education level: Not on file   Occupational History    Not on file   Tobacco Use    Smoking status: Never    Smokeless tobacco: Never   Vaping Use    Vaping status: Never Used   Substance and Sexual Activity    Alcohol use: Yes     Comment: weekly social drinking    Drug use: Never    Sexual activity: Yes     Partners: Female     Birth control/protection: Pull-out method, Condom   Other Topics Concern    Parent/sibling w/ CABG, MI or angioplasty before 65F 55M? No   Social History Narrative    Not on file     Social Drivers of Health     Financial Resource Strain: Low Risk  (10/5/2024)    Financial Resource Strain      Within the past 12 months, have you or your family members you live with been unable to get utilities (heat, electricity) when it was really needed?: No   Food Insecurity: Low Risk  (10/5/2024)    Food Insecurity     Within the past 12 months, did you worry that your food would run out before you got money to buy more?: No     Within the past 12 months, did the food you bought just not last and you didn t have money to get more?: No   Transportation Needs: Low Risk  (10/5/2024)    Transportation Needs     Within the past 12 months, has lack of transportation kept you from medical appointments, getting your medicines, non-medical meetings or appointments, work, or from getting things that you need?: No   Physical Activity: Insufficiently Active (10/5/2024)    Exercise Vital Sign     Days of Exercise per Week: 5 days     Minutes of Exercise per Session: 20 min   Stress: No Stress Concern Present (10/5/2024)    [employer] - Occupational Stress Questionnaire     Feeling of Stress : Only a little   Social Connections: Unknown (10/5/2024)    Social Connection and Isolation Panel [NHANES]     Frequency of Communication with Friends and Family: Not on file     Frequency of Social Gatherings with Friends and Family: Once a week     Attends Islam Services: Not on file     Active Member of Clubs or Organizations: Not on file     Attends Club or Organization Meetings: Not on file     Marital Status: Not on file   Interpersonal Safety: Low Risk  (10/8/2024)    Interpersonal Safety     Do you feel physically and emotionally safe where you currently live?: Yes     Within the past 12 months, have you been hit, slapped, kicked or otherwise physically hurt by someone?: No     Within the past 12 months, have you been humiliated or emotionally abused in other ways by your partner or ex-partner?: No   Housing Stability: Low Risk  (10/5/2024)    Housing Stability     Do you have housing? : Yes      Are you worried about losing your housing?: No       Family History   Problem Relation Age of Onset    Breast Cancer Mother 50    Hyperlipidemia Father     Skin Cancer Sister         precancerous moles    No Known Problems Brother     Substance Abuse Brother         accidental overdose. bought xanax off the street and it was fentanyl    Cerebrovascular Disease Maternal Grandmother         stroke age 80s    Hypertension Maternal Grandmother     Valvular heart disease Maternal Grandmother     Dementia Maternal Grandfather     Hypertension Maternal Grandfather     Melanoma Maternal Grandfather     Cerebrovascular Disease Maternal Grandfather     Hypertension Paternal Grandmother     Lymphoma Paternal Grandmother          of lymphoma    Breast Cancer Paternal Grandmother         survived breast cancer    Hypertension Paternal Grandfather     Chronic Obstructive Pulmonary Disease Paternal Grandfather         smoker       No Known Allergies    Current Outpatient Medications   Medication Sig Dispense Refill    tadalafil (CIALIS) 5 MG tablet Take 1 tablet (5 mg) by mouth every 24 hours. 90 tablet 0     No current facility-administered medications for this visit.         PEx:   There were no vitals taken for this visit.    PSYCH: NAD  EYES: EOMI  MOUTH: MMM  NEURO: AAO x3      A/P: Lottie Hollis is a 34 year old male with difficulty maintaining erection at times  -Continue low dose Cialis (5mg) daily. SE reviewed, including priapism.   -12 mo for med refill and PRN      Maile Richards PA-C  [facility]        15 minutes spent on the date of the encounter doing chart review, review of outside records, review of test results, interpretation of tests, patient visit and documentation

## 2025-03-12 NOTE — NURSING NOTE
Current patient location: [place]    Is the patient currently in the state of [place]? YES    Visit mode: VIDEO    If the visit is dropped, the patient can be reconnected by:VIDEO VISIT: Send to e-mail at: [email or web address]    Will anyone else be joining the visit? NO  (If patient encounters technical issues they should call [phone])    Are changes needed to the allergy or medication list? E-check in was reviewed/completed for today's visit. VF did not review e-check in information again with Pat due to this.       Are refills needed on medications prescribed by this physician? YES    Rooming Documentation:  Not applicable    Reason for visit: RECHECK    Kasey VILLALOBOS

## 2025-03-12 NOTE — LETTER
3/12/2025       RE: Stas Hollis  [address]  [place] [address]     Dear Colleague,    Thank you for referring your patient, Stas Hollis, to the [facility] [place] at [facility]. Please see a copy of my visit note below.    How would you like to obtain your AVS? MyChart  If the video visit is dropped, the invitation should be resent by: Text to cell phone: [phone]  Will anyone else be joining your video visit? No          Video-Visit Details    Type of service:  Video Visit     Originating Location (pt. Location): Home    Distant Location (provider location):  On-site  Platform used for Video Visit: BUX  Start time: 1:52pm  End time: 2:00pm    CC: ED     HPI:  Stas Hollis is a very pleasant 34 year old male who presents in follow-up of the above.     Last seen 12/5/2023: Post covid in Aug noted to have some issue with maintaining an erection at times. Has good libido and does not seem to be an issue. Can't help think about performance ad if it will be successful to end with orgasm.   Pre-DM noted.    TODAY 3/11/25:  Has been on daily Cialis 5mg. Working well.   [facility].   Cancelled appt with Dr. Gonzalez re: infertility. Wife became pregnant!       Past Medical History:   Diagnosis Date     NO ACTIVE PROBLEMS        Past Surgical History:   Procedure Laterality Date     no prior surgeries         Social History     Socioeconomic History     Marital status:      Spouse name: Not on file     Number of children: Not on file     Years of education: Not on file     Highest education level: Not on file   Occupational History     Not on file   Tobacco Use     Smoking status: Never     Smokeless tobacco: Never   Vaping Use     Vaping status: Never Used   Substance and Sexual Activity     Alcohol use: Yes     Comment: weekly social drinking     Drug use: Never      Sexual activity: Yes     Partners: Female     Birth control/protection: Pull-out method, Condom   Other Topics Concern     Parent/sibling w/ CABG, MI or angioplasty before 65F 55M? No   Social History Narrative     Not on file     Social Drivers of Health     Financial Resource Strain: Low Risk  (10/5/2024)    Financial Resource Strain      Within the past 12 months, have you or your family members you live with been unable to get utilities (heat, electricity) when it was really needed?: No   Food Insecurity: Low Risk  (10/5/2024)    Food Insecurity      Within the past 12 months, did you worry that your food would run out before you got money to buy more?: No      Within the past 12 months, did the food you bought just not last and you didn t have money to get more?: No   Transportation Needs: Low Risk  (10/5/2024)    Transportation Needs      Within the past 12 months, has lack of transportation kept you from medical appointments, getting your medicines, non-medical meetings or appointments, work, or from getting things that you need?: No   Physical Activity: Insufficiently Active (10/5/2024)    Exercise Vital Sign      Days of Exercise per Week: 5 days      Minutes of Exercise per Session: 20 min   Stress: No Stress Concern Present (10/5/2024)    [employer] [employer] - Occupational Stress Questionnaire      Feeling of Stress : Only a little   Social Connections: Unknown (10/5/2024)    Social Connection and Isolation Panel [NHANES]      Frequency of Communication with Friends and Family: Not on file      Frequency of Social Gatherings with Friends and Family: Once a week      Attends Religion Services: Not on file      Active Member of Clubs or Organizations: Not on file      Attends Club or Organization Meetings: Not on file      Marital Status: Not on file   Interpersonal Safety: Low Risk  (10/8/2024)    Interpersonal Safety      Do you feel physically and emotionally safe where you  currently live?: Yes      Within the past 12 months, have you been hit, slapped, kicked or otherwise physically hurt by someone?: No      Within the past 12 months, have you been humiliated or emotionally abused in other ways by your partner or ex-partner?: No   Housing Stability: Low Risk  (10/5/2024)    Housing Stability      Do you have housing? : Yes      Are you worried about losing your housing?: No       Family History   Problem Relation Age of Onset     Breast Cancer Mother 50     Hyperlipidemia Father      Skin Cancer Sister         precancerous moles     No Known Problems Brother      Substance Abuse Brother         accidental overdose. bought xanax off the street and it was fentanyl     Cerebrovascular Disease Maternal Grandmother         stroke age 80s     Hypertension Maternal Grandmother      Valvular heart disease Maternal Grandmother      Dementia Maternal Grandfather      Hypertension Maternal Grandfather      Melanoma Maternal Grandfather      Cerebrovascular Disease Maternal Grandfather      Hypertension Paternal Grandmother      Lymphoma Paternal Grandmother          of lymphoma     Breast Cancer Paternal Grandmother         survived breast cancer     Hypertension Paternal Grandfather      Chronic Obstructive Pulmonary Disease Paternal Grandfather         smoker       No Known Allergies    Current Outpatient Medications   Medication Sig Dispense Refill     tadalafil (CIALIS) 5 MG tablet Take 1 tablet (5 mg) by mouth every 24 hours. 90 tablet 0     No current facility-administered medications for this visit.         PEx:   There were no vitals taken for this visit.    PSYCH: NAD  EYES: EOMI  MOUTH: MMM  NEURO: AAO x3      A/P: Lottie Hollis is a 34 year old male with difficulty maintaining erection at times  -Continue low dose Cialis (5mg) daily. SE reviewed, including priapism.   -12 mo for med refill and PRN      Maile Richards PA-C  [facility]        15 minutes spent on the date of the  encounter doing chart review, review of outside records, review of test results, interpretation of tests, patient visit and documentation                 Again, thank you for allowing me to participate in the care of your patient.      Sincerely,    Maile Richards PA-C, DHIRAJ

## 2025-04-01 ENCOUNTER — VIRTUAL VISIT (OUTPATIENT)
Dept: ONCOLOGY | Facility: CLINIC | Age: 35
End: 2025-04-01
Attending: GENETIC COUNSELOR, MS
Payer: COMMERCIAL

## 2025-04-01 DIAGNOSIS — Z80.3 FAMILY HISTORY OF MALIGNANT NEOPLASM OF BREAST: Primary | ICD-10-CM

## 2025-04-01 DIAGNOSIS — Z80.42 FAMILY HISTORY OF PROSTATE CANCER: ICD-10-CM

## 2025-04-01 DIAGNOSIS — Z80.8 FAMILY HISTORY OF MELANOMA: ICD-10-CM

## 2025-04-01 DIAGNOSIS — Z80.51 FAMILY HISTORY OF KIDNEY CANCER: ICD-10-CM

## 2025-04-01 DIAGNOSIS — Z80.9 FAMILY HISTORY OF CANCER: ICD-10-CM

## 2025-04-01 PROCEDURE — 96041 GENETIC COUNSELING SVC EA 30: CPT | Mod: GT,95 | Performed by: GENETIC COUNSELOR, MS

## 2025-04-01 NOTE — PATIENT INSTRUCTIONS
Assessing Cancer Risk  Cancer is a common diagnosis which impacts many families.  Individuals may develop cancer due to environmental factors (such as exposures and lifestyle), aging, genetic predisposition, or a combination of these factors.      Only about 5-10% of cancers are thought to be due to an inherited cancer susceptibility gene.    These families often have:  Several people with the same or related types of cancer  Cancers diagnosed at a young age (before age 50)  Individuals with more than one primary cancer  Multiple generations of the family affected with cancer    Comprehensive Breast and Gynecologic Cancer Panel  We each inherit two copies of every gene in our bodies: one from our mother, and one from our father. Each gene has a specific job to do.  When a gene has a mistake or  mutation  in it, it does not work like it should.     Some people may be candidates for genetic testing of more than one gene.  For these families, genetic testing using a cancer panel may be offered. These panels will test different genes at once known to increase the risk for breast, ovarian, uterine, and/or other cancers.    This handout will review common hereditary breast and gynecologic cancer syndromes. The genes that will be discussed in this handout are: JOSEFA, BRCA1, BRCA2, BRIP1, CDH1, CHEK2, MLH1, MSH2, MSH6, PMS2, EPCAM, PTEN, PALB2, RAD51C, RAD51D, and TP53.    The purpose of this handout is to serve as a brief summary of the breast and gynecologic cancer risk genes that have published clinical management guidelines for individuals who are found to carry a mutation. Inheriting a mutation does not mean a person will develop cancer, but it does significantly increase their risk above the general population risk.     ______________________________________________________________________________    Hereditary Breast and Ovarian Cancer Syndrome (BRCA1 and BRCA2)  A single mutation in one of the copies of BRCA1 or  BRCA2 increases the risk for breast and ovarian cancer, among others.  The risk for pancreatic cancer and melanoma may also be slightly increased in some families.  The chart below shows the chance that someone with a BRCA mutation would develop cancer in his or her lifetime1,2,3,4.       Lifetime Cancer Risks    General Population BRCA1  BRCA2   Breast  12% >60% >60%   Ovarian  1-2% 39-58% 13-29%   Prostate 12% 7-26% 19-61%   Male Breast 0.1% 0.2-1.2% 1.8-7.1%   Pancreas 1-2% Up to 5% 5-10%     A person s ethnic background is also important to consider, as individuals of Ashkenazi Jain ancestry have a higher chance of having a BRCA gene mutation.  There are three BRCA mutations that occur more frequently in this population.      Franklin Syndrome (MLH1, MSH2, MSH6, PMS2, and EPCAM)  Currently five genes are known to cause Franklin Syndrome: MLH1, MSH2, MSH6, PMS2, and EPCAM.  A single mutation in one of the Franklin Syndrome genes increases the risk for colon, endometrial, ovarian, and stomach cancers.  Other cancers that occur less commonly in Franklin Syndrome include urinary tract, skin, and brain cancers.  The chart below shows the chance that a person with Franklin syndrome would develop cancer in his or her lifetime5.      Lifetime Cancer Risks    General Population Franklin Syndrome   Colon 5% 10-61%   Endometrial 3% 13-57%   Ovarian 1-2% 1-38%   Stomach <1% 1-9%   *Cancer risk varies depending on Franklin syndrome gene found      Cowden Syndrome (PTEN)  Cowden syndrome is a hereditary condition that increases the risk for breast, thyroid, endometrial, colon, and kidney cancer.  Cowden syndrome is caused by a mutation in the PTEN gene.  A single mutation in one of the copies of PTEN causes Cowden syndrome and increases cancer risk.  The chart below shows the chance that someone with a PTEN mutation would develop cancer in their lifetime6,7.  Other benign features seen in some individuals with Cowden syndrome include benign  skin lesions (facial papules, keratoses, lipomas), learning disability, autism, thyroid nodules, colon polyps, and larger head size.     Lifetime Cancer Risks    General Population Cowden   Breast 12% 40-60%*   Thyroid 1% Up to 38%   Renal 1-2% Up to 35%   Endometrial 3% Up to 28%   Colon 5% Up to 9%   Melanoma 2-3% Up to 6%   *Emerging data suggests the risk for breast cancer could be greater than 60%               Li-Fraumeni Syndrome (TP53)  Li-Fraumeni Syndrome (LFS) is a cancer predisposition syndrome caused by a mutation in the TP53 gene. A single mutation in one of the copies of TP53 increases the risk for multiple cancers. Individuals with LFS are at an increased risk for developing cancer at a young age. The lifetime risk for development of a LFS-associated cancer is 50% by age 30 and 90% by age 60.   Core Cancers: Sarcomas, Breast, Brain, Lung, Leukemias/Lymphomas, Adrenocortical carcinomas  Other Cancers: Gastrointestinal, Thyroid, Skin, Genitourinary       Hereditary Diffuse Gastric Cancer (CDH1)  Currently, one gene is known to cause hereditary diffuse gastric cancer (HDGC): CDH1.  Individuals with HDGC are at increased risk for diffuse gastric cancer and lobular breast cancer. Of people diagnosed with HDGC, 30-50% have a mutation in the CDH1 gene.  This suggests there are likely other genes that may cause HDGC that have not been identified yet.      Lifetime Cancer Risks    General Population HDGC   Diffuse Gastric  <1% ~80%   Breast 12% 41-60%       Additional Genes    JOSEFA  JOSEFA is a moderate-risk breast cancer gene. Women who have a mutation in JOSEFA can have between a 2-4 fold increased risk for breast cancer compared to the general population8. JOSEFA mutations have also been associated with increased risk for pancreatic cancer between 5-10%9. Individuals who inherit two JOSEFA mutations have a condition called ataxia-telangiectasia (AT).  This rare autosomal recessive condition affects the nervous system  and immune system, and is associated with progressive cerebellar ataxia beginning in childhood. Individuals with ataxia-telangiectasia often have a weakened immune system and have an increased risk for childhood cancers.    PALB2  Mutations in PALB2 have been shown to increase the risk of breast cancer up to 41-60% in some families; where individuals fall within this risk range is dependent upon family [email or web address]. PALB2 mutations have also been associated with increased risk for pancreatic cancer between 5-10%.  Individuals who inherit two PALB2 mutations--one from their mother and one from their father--have a condition called Fanconi Anemia.  This rare autosomal recessive condition is associated with short stature, developmental delay, bone marrow failure, and increased risk for childhood cancers.    CHEK2   CHEK2 is a moderate-risk breast cancer gene.  Women who have a mutation in CHEK2 have around a 2-4 fold increased risk for breast cancer compared to the general population, and this risk may be higher depending upon family history.11,12,13 The risk of colon cancer may be twice as high as the general population risk of colon cancer of 5%. Mutations in CHEK2 have also been shown to increase the risk of other cancers, including prostate, however these cancer risks are currently not well understood.    BRIP1, RAD51C and RAD51D  Mutations in RAD51C and RAD51D have been shown to increase the risk of ovarian cancer and breast cancer 14,. Mutations in BRIP1 have been shown to increase the risk of ovarian cancer and possibly female breast cancer 15 .       Lifetime Cancer Risk    General Population        BRIP1   RAD51C  RAD51D   Breast 12% Not well defined 20-40% 20-40%   Ovarian 1-2% 5-15% 10-15% 10-20%     ______________________________________________________________  Inheritance  All of the cancer syndromes reviewed above are inherited in an autosomal dominant pattern.  This means that if a parent has a mutation,  each of their children will have a 50% chance of inheriting that same mutation. Therefore, each child --male or female-- would have a 50% chance of being at increased risk for developing cancer.    Image obtained from Genetics Home Reference, 2013     Mutations in some genes can occur de christiana, which means that a person s mutation occurred for the first time in them and was not inherited from a parent.  Now that they have the mutation, however, it can be passed on to future generations.    Genetic Testing  Genetic testing involves a blood test and will look for any harmful mutations that are associated with increased cancer risk.  If possible, it is recommended that the person(s) who has had cancer be tested before other family members.  That person will give us the most useful information about whether or not a specific gene is associated with the cancer in the family.    Results  There are three possible results of genetic testing:  Positive--a harmful mutation was identified in one or more of the genes  Negative--no mutations were identified in any of the genes tested  Variant of unknown significance--a variation in one of the genes was identified, but it is unclear how this impacts cancer risk in the family    Advantages and Disadvantages   There are advantages and disadvantages to genetic testing.    Advantages  May clarify your cancer risk  Can help you make medical decisions  May explain the cancers in your family  May give useful information to your family members (if you share your results)    Disadvantages  Possible negative emotional impact of learning about inherited cancer risk  Uncertainty in interpreting a negative test result in some situations  Possible genetic discrimination concerns (see below)    Genetic Information Nondiscrimination Act (RAJ)  The Genetic Information Nondiscrimination Act of 2008 (RAJ) is a federal law that protects individuals from health insurance or employment discrimination  based on a genetic test result alone (with some exceptions, including employers with fewer than 15 employees, and ).  Although rare, RAJ  does not cover discrimination protections in terms of life insurance, long term care, or disability insurances.  Visit the National Human [employer] [place] website to learn more.    Reducing Cancer Risk  All of the genes described in this handout have nationally recognized cancer screening guidelines that would be recommended for individuals who test positive.  In addition to increased cancer screening, surgeries may be offered or recommended to reduce cancer risk.  Recommendations are based upon an individual s genetic test result as well as their personal and family history of cancer.    Questions to Think About Regarding Genetic Testing:  What effect will the test result have on me and my relationship with my family members if I have an inherited gene mutation?  If I don t have a gene mutation?  Should I share my test results, and how will my family react to this news, which may also affect them?  Are my children ready to learn new information that may one day affect their own health?    Hereditary Cancer Resources    FORCE: Facing Our Risk of Cancer Empowered facingourrisk.org   Bright Pink bebrightpink.org   Li-Fraumeni Syndrome Association lfsassociation.org   PTEN World PTENworld.com   No stomach for cancer, Inc. nostomachforcancer.org   Stomach cancer relief network Scrnet.org   Collaborative Group of the Americas on Inherited Colorectal Cancer (CGA) cgaicc.com    Cancer Care cancercare.org   American Cancer Society (ACS) cancer.org   National Cancer [place] (NCI) cancer.gov     Please call us if you have any questions or concerns.   Cancer Risk Management Program [phone]-CANCER ([phone])  Lowell Singh, MS [facility] [phone]  Tali Palacio, MS, [facility] [phone]  Lima Rivera, MS, [facility]  [phone]  Haley Zazueta, MS, [facility]  [phone]  Aury Reilly,  MS, [facility]  [phone]  Rachel Ortiz, MS, [facility] [phone]  Billie Granda, MS, [facility] [phone]    References  Anirudh Rehman PDP, Cherelle S, Susannah VOGEL, Olesya JE, Arturo JL, Arlin N, Jatin H, Daniel O, Nicole A, Pasini B, Radistephie P, Manbrenda S, Robin DM, Holm N, Kala E, Freeman H, Hamm E, Erik J, Gronkayleigh J, Theresa B, Tulinius H, Thorlacius S, Eerola H, Nevanlinna H, Terrence K, Judi OP. Average risks of breast and ovarian cancer associated with BRCA1 or BRCA2 mutations detected in case series unselected for family history: a combined analysis of 222 studies. Am J Hum Sarahy. 2003;72:1117-30.  Ada N, Sary M, Brandy G.  BRCA1 and BRCA2 Hereditary Breast and Ovarian Cancer. Gene Reviews online. 2013.  Liborio YC, Ned S, Kayode G, Rasmussen S. Breast cancer risk among male BRCA1 and BRCA2 mutation carriers. J Natl Cancer Inst. 2007;99:1811-4.  Raheel MARTINEZ, Latonia I, Bryan J, Tara E, Keron ER, Sadi F. Risk of breast cancer in male BRCA2 carriers. J Med Sarahy. 2010;47:710-1.  National Comprehensive Cancer Network. Clinical practice guidelines in oncology, colorectal cancer screening. Available online (registration required). 2015.  Leander MH, Jason J, Ana J, Rere DUNLAP, Martha MS, Eng C. Lifetime cancer risks in individuals with germline PTEN mutations. Clin Cancer Res. 2012;18:400-7.  Javier R. Cowden Syndrome: A Critical Review of the Clinical Literature. J Sarahy . 2009:18:13-27.  Claudia MARK, Khoa DUPREE, Liat S, Shaniqua P, Lola T, Sofia M, Harsha B, Holly H, Abhay R, Leopoldo K, Miguel L, Raheel MARTINEZ, Robin DUPREE, Karan DF, Ab MR, The Breast Cancer Susceptibility Collaboration (UK) & Ani JONES. JOSEFA mutations that cause ataxia-telangiectasia are breast cancer susceptibility alleles. Nature Genetics. 2006;38:873-875  Marshall N , Renea Y, Anabella J, Tee L, Luana REHMAN , Chadwick ML, Suzette S, Cintia AG, Jewels S, Shashi ML, Kimi J , Shayy R, Mumtaz COLLAZO, Christian  JR, Lucila VE, Alexandra M, Vomannystein B, Ashlee N, Yemi RH, Cristian KW, and Nadia AP. JOSEFA mutations in patients with hereditary pancreatic cancer. Cancer Discover. 2012;2:41-46  Randall SCHWARTZ., et al. Breast-Cancer Risk in Families with Mutations in PALB2. NEJM. 2014; 371(6):497-506.  CHEK2 Breast Cancer Case-Control Consortium. CHEK2*1100delC and susceptibility to breast cancer: A collaborative analysis involving 10,860 breast cancer cases and 9,065 controls from 10 studies. Am J Hum Sarahy, 74 (2004), pp. 6540-8948  Van T, Angus S, Kadi K, et al. Spectrum of Mutations in BRCA1, BRCA2, CHEK2, and TP53 in Families at High Risk of Breast Cancer. DAMION. 2006;295(12):2736-9640.   Matt C, Bowen D, Tyra MARK, et al. Risk of breast cancer in women with a CHEK2 mutation with and without a family history of breast cancer. J Clin Oncol. 2011;29:5177-6594.  Song H, Betos E, Ramus SJ, et al. Contribution of germline mutations in the RAD51B, RAD51C, and RAD51D genes to ovarian cancer in the population. J Clin Oncol. 2015;33(26):3559-5842. Doi:10.1200/JCO.2015.61.2408.  Lalito T, Simon DF, Sheeba P, et al. Mutations in BRIP1 confer high risk of ovarian cancer. Marta Sarahy. 2011;43(11):3151-0772. doi:10.1038/ng.955.

## 2025-04-01 NOTE — NURSING NOTE
Current patient location: [address] [place] [address] [address]  [place] [address]    Is the patient currently in the state of [place]? YES    Visit mode: VIDEO    If the visit is dropped, the patient can be reconnected by:VIDEO VISIT: Send to e-mail at: [email or web address]    Will anyone else be joining the visit? NO  (If patient encounters technical issues they should call [phone]    Are changes needed to the allergy or medication list? N/A    Are refills needed on medications prescribed by this physician? NO    Rooming Documentation:  Questionnaire(s) not done per department protocol    Reason for visit: Consult    Constanza COLEMANF

## 2025-04-01 NOTE — PROGRESS NOTES
Virtual Visit Details    Type of service:  Video Visit     Originating Location (pt. Location): Home  Distant Location (provider location):  Off-site  Platform used for Video Visit: [employer]    4/1/2025    Referring Provider: Camila Calderon MD    Presenting Information:   I spoke with Stas Hollis over video today for genetic counseling to discuss his family history of cancer. This appointment was conducted virtually due to COVID-19 precautions. We talked today to review this history, cancer screening recommendations, and available genetic testing options.    Personal History:  Stas is a 34 year old male. He does not have any personal history of cancer.     Stas has not had a colonoscopy due to his age. He has regular skin exams, most recent in April 2024. Stas reported no history of tobacco use and social alcohol use.    Family History: (Please see scanned pedigree for detailed family history information)  Siblings:  His sister is 29 years old and has a history of one pre-cancerous mole in high school. She did have a lot of sun exposure when younger.   Maternal:  His mother is 65 years old and was diagnosed with breast cancer at age 50. This was an early stage and treatment included surgery only.   His aunt is 69 or 70 years old and has a history of breast cancer at an age unknown to Stas.  He has two uncles who are both in their 60s and both have a history of basal cell carcinoma on their face.  His grandfather was diagnosed with melanoma (ear) in his 70s. He also had a history of multiple basal cell carcinomas (face, neck). He passed away in his late 80s. He had a significant history of sun exposure (WWII on boats, sailing, golf, etc).   Paternal:  His father is 65 years old with no known history of cancer.   His grandfather had both renal and prostate cancer at ages unknown to Stas (he thinks over age 50 for both). He passed away in his late 70s. He also had emphysema and had a history of  smoking.  His grandmother was diagnosed with a rare type of lymphoma in her early 70s. She passed away due to this at age 88.    Stas is not aware of any genetic testing for inherited cancer risk in any of his family members.     His maternal ethnicity is Hebrew, [place]. His paternal ethnicity is Hebrew, English, [place]. There is no known Ashkenazi Baptist ancestry on either side of his family.     Discussion:  Stas's family history of cancer is suggestive of a hereditary cancer syndrome.  We reviewed the features of sporadic, familial, and hereditary cancers. In looking at Stas's family history, it is possible that a cancer susceptibility gene is present due to his maternal family history of breast cancer and melanoma.  We discussed the natural history and genetics of hereditary cancer. Based on his family history, we discussed genes in which mutations are associated with an increased risk for breast cancer and melanoma, such as the BRCA1 and BRCA2 genes. Mutations in these genes cause a condition known as Hereditary Breast and Ovarian Cancer syndrome (HBOC). Women with a mutation in either of these genes are at increased risk for breast and ovarian cancer. There is also an increased risk for a second primary breast cancer. Men with a mutation in either of these genes are at increased risk for breast and prostate cancer. Both women and men may also be at increased risk for pancreatic cancer and melanoma.   A detailed handout regarding these genes and other genes in which mutations are associated with an increased risk for breast cancer will be provided to Stas via [employer] and can be found in the after visit summary. Topics included: inheritance pattern, cancer risks, cancer screening recommendations, and also risks, benefits and limitations of testing.    We spent some time discussing the most informative approach to genetic testing. In families suspicious for a hereditary cancer syndrome, it is always  most informative to begin testing with a family member who has a history of cancer. When we begin testing in someone who has not had cancer, a positive result (detected gene mutation) would be informative; however, a negative result (no gene mutation detected) would be uninformative. Uninformative results provide little new information about cancer risks. The most informative candidate for genetic testing in Stas's family is his mother who had breast cancer. He will talk with her about the option of genetic testing. He is interested in proceeding with his own testing at this time and stated that he understands the limitations in interpreting a negative result for himself in the absence of testing his family members.    Based on his personal and family history, Stas meets current National Comprehensive Cancer Network (NCCN) criteria for genetic testing of high-penetrance breast cancer susceptibility genes, such as BRCA1, BRCA2, CDH1, PALB2, PTEN, STK11, and TP53.     We discussed that there are additional genes that could cause increased risk for breast, melanoma, prostate, kidney, and other cancers. As many of these genes present with overlapping features in a family and accurate cancer risk cannot always be established based upon the pedigree analysis alone, it would be reasonable for Stas to consider panel genetic testing to analyze multiple genes at once.  We reviewed genetic testing options for Stas based on his personal and family history: a panel of genes associated with an increased risk for certain cancers, or larger panel options to include genes associated with increased risk for multiple different cancer types. He expressed an interest in more broad testing and opted for a Custom Panel (a combination of the Common Hereditary Cancers Panel + Hereditary Skin Cancer Panel, including preliminary-evidence genes for skin cancer + Renal/Urinary Tract Cancers Panel).  Genetic testing is available for  48 genes associated with cancers of the breast, ovary, uterus, prostate and gastrointestinal system: [facility] Common Hereditary Cancers panel (APC, JOSEFA, AXIN2, BAP1, BARD1, BMPR1A, BRCA1, BRCA2, BRIP1, CDH1, CDK4, CDKN2A, CHEK2, CTNNA1, DICER1, EPCAM, FH, GREM1, HOXB13, KIT, MBD4, MEN1, MLH1, MSH2, MSH3, MSH6, MUTYH, NF1, NTHL1, PALB2, PDGFRA, PMS2, POLD1, POLE, PTEN, RAD51C, RAD51D, SDHA, SDHB, SDHC, SDHD, SMAD4, SMARCA4, STK11, TP53, TSC1, TSC2, VHL).    We discussed that many of these genes are associated with specific hereditary cancer syndromes and published management guidelines: Hereditary Breast and Ovarian Cancer syndrome (BRCA1, BRCA2), Franklin syndrome (MLH1, MSH2, MSH6, PMS2, EPCAM), Familial Adenomatous Polyposis (APC), Hereditary Diffuse Gastric Cancer (CDH1), Familial Atypical Multiple Mole Melanoma syndrome (CDK4, CDKN2A), Multiple Endocrine Neoplasia type 1 (MEN1), Juvenile Polyposis syndrome (BMPR1A, SMAD4), Cowden syndrome (PTEN), Li Fraumeni syndrome (TP53), Hereditary Paraganglioma and Pheochromocytoma syndrome (SDHA, SDHB, SDHC, SDHD), Peutz-Jeghers syndrome (STK11), MUTYH Associated Polyposis (MUTYH), Tuberous sclerosis complex (TSC1, TSC2), Von Hippel-Lindau disease (VHL), and Neurofibromatosis type 1 (NF1).   The JOSEFA, AXIN2, BAP1, BRIP1, CHEK2, FH, GREM1, MBD4, MSH3, NTHL1, PALB2, POLD1, POLE, RAD51C, and RAD51D genes are associated with increased cancer risk and have published management guidelines for certain cancers.   The remaining genes (BARD1, CTNNA1, DICER1, HOXB13, KIT, PDGFRA, and SMARCA4) are associated with increased cancer risk and may allow us to make medical recommendations when mutations are identified.   Due to COVID-19 precautions consent was obtained over the phone/video today. Genetic testing via a Custom Panel (a combination of the Common Hereditary Cancers Panel + Hereditary Skin Cancer Panel, including preliminary-evidence genes for skin cancer + Renal/Urinary Tract  Cancers Panel) will be sent to [facility]. Stas opted to schedule a blood draw for testing. Turnaround time from date when sample is received at the lab: approximately 3-4 weeks.  Medical Management: For Stas, we reviewed that the information from genetic testing may determine:  additional cancer screening for which Stas may qualify (i.e. breast screening, more frequent colonoscopies, more frequent dermatologic exams, etc.),  and targeted chemotherapies if he were to develop certain cancers in the future (i.e. immunotherapy for individuals with Franklin syndrome, PARP inhibitors, etc.).   These recommendations will be discussed in detail once genetic testing is completed.     Plan:  1) Today Stas elected to proceed with genetic testing via a Custom Panel (a combination of the Common Hereditary Cancers Panel + Hereditary Skin Cancer Panel, including preliminary-evidence genes for skin cancer + Renal/Urinary Tract Cancers Panel) offered by [facility].  2) This information should be available in 4-5 weeks.  3) Stas will be scheduled for a virtual visit (phone or video) to discuss the results.    I spent 58 minutes on the date of the encounter doing chart review, virtual visit, documentation, and further activities as noted above.      Aury Reilly MS, [facility]  Licensed, Certified Genetic Counselor  Office: [phone]  Email: [email or web address]

## 2025-04-01 NOTE — LETTER
4/1/2025      Stas Hollis  [address]  [place] [address]      Dear Colleague,    Thank you for referring your patient, Stas Hollis, to the [facility]. Please see a copy of my visit note below.    Virtual Visit Details    Type of service:  Video Visit     Originating Location (pt. Location): Home  Distant Location (provider location):  Off-site  Platform used for Video Visit: [place]    4/1/2025    Referring Provider: Camila Calderon MD    Presenting Information:   I spoke with Stas Hollis over video today for genetic counseling to discuss his family history of cancer. This appointment was conducted virtually due to COVID-19 precautions. We talked today to review this history, cancer screening recommendations, and available genetic testing options.    Personal History:  Stas is a 34 year old male. He does not have any personal history of cancer.     Stas has not had a colonoscopy due to his age. He has regular skin exams, most recent in April 2024. Stas reported no history of tobacco use and social alcohol use.    Family History: (Please see scanned pedigree for detailed family history information)  Siblings:  His sister is 29 years old and has a history of one pre-cancerous mole in high school. She did have a lot of sun exposure when younger.   Maternal:  His mother is 65 years old and was diagnosed with breast cancer at age 50. This was an early stage and treatment included surgery only.   His aunt is 69 or 70 years old and has a history of breast cancer at an age unknown to Stas.  He has two uncles who are both in their 60s and both have a history of basal cell carcinoma on their face.  His grandfather was diagnosed with melanoma (ear) in his 70s. He also had a history of multiple basal cell carcinomas (face, neck). He passed away in his late 80s. He had a significant history of sun exposure (WWII on boats, sailing, golf, etc).    Paternal:  His father is 65 years old with no known history of cancer.   His grandfather had both renal and prostate cancer at ages unknown to Stas (he thinks over age 50 for both). He passed away in his late 70s. He also had emphysema and had a history of smoking.  His grandmother was diagnosed with a rare type of lymphoma in her early 70s. She passed away due to this at age 88.    Stas is not aware of any genetic testing for inherited cancer risk in any of his family members.     His maternal ethnicity is [place], [place]. His paternal ethnicity is [place], English, [place]. There is no known Ashkenazi Christianity ancestry on either side of his family.     Discussion:  Stas's family history of cancer is suggestive of a hereditary cancer syndrome.  We reviewed the features of sporadic, familial, and hereditary cancers. In looking at Stas's family history, it is possible that a cancer susceptibility gene is present due to his maternal family history of breast cancer and melanoma.  We discussed the natural history and genetics of hereditary cancer. Based on his family history, we discussed genes in which mutations are associated with an increased risk for breast cancer and melanoma, such as the BRCA1 and BRCA2 genes. Mutations in these genes cause a condition known as Hereditary Breast and Ovarian Cancer syndrome (HBOC). Women with a mutation in either of these genes are at increased risk for breast and ovarian cancer. There is also an increased risk for a second primary breast cancer. Men with a mutation in either of these genes are at increased risk for breast and prostate cancer. Both women and men may also be at increased risk for pancreatic cancer and melanoma.   A detailed handout regarding these genes and other genes in which mutations are associated with an increased risk for breast cancer will be provided to Stas via [employer] and can be found in the after visit summary. Topics included: inheritance  pattern, cancer risks, cancer screening recommendations, and also risks, benefits and limitations of testing.    We spent some time discussing the most informative approach to genetic testing. In families suspicious for a hereditary cancer syndrome, it is always most informative to begin testing with a family member who has a history of cancer. When we begin testing in someone who has not had cancer, a positive result (detected gene mutation) would be informative; however, a negative result (no gene mutation detected) would be uninformative. Uninformative results provide little new information about cancer risks. The most informative candidate for genetic testing in Stas's family is his mother who had breast cancer. He will talk with her about the option of genetic testing. He is interested in proceeding with his own testing at this time and stated that he understands the limitations in interpreting a negative result for himself in the absence of testing his family members.    Based on his personal and family history, Stas meets current National Comprehensive Cancer Network (NCCN) criteria for genetic testing of high-penetrance breast cancer susceptibility genes, such as BRCA1, BRCA2, CDH1, PALB2, PTEN, STK11, and TP53.     We discussed that there are additional genes that could cause increased risk for breast, melanoma, prostate, kidney, and other cancers. As many of these genes present with overlapping features in a family and accurate cancer risk cannot always be established based upon the pedigree analysis alone, it would be reasonable for Stas to consider panel genetic testing to analyze multiple genes at once.  We reviewed genetic testing options for Stas based on his personal and family history: a panel of genes associated with an increased risk for certain cancers, or larger panel options to include genes associated with increased risk for multiple different cancer types. He expressed an interest  in more broad testing and opted for a Custom Panel (a combination of the Common Hereditary Cancers Panel + Hereditary Skin Cancer Panel, including preliminary-evidence genes for skin cancer + Renal/Urinary Tract Cancers Panel).  Genetic testing is available for 48 genes associated with cancers of the breast, ovary, uterus, prostate and gastrointestinal system: [facility] Common Hereditary Cancers panel (APC, JOSEFA, AXIN2, BAP1, BARD1, BMPR1A, BRCA1, BRCA2, BRIP1, CDH1, CDK4, CDKN2A, CHEK2, CTNNA1, DICER1, EPCAM, FH, GREM1, HOXB13, KIT, MBD4, MEN1, MLH1, MSH2, MSH3, MSH6, MUTYH, NF1, NTHL1, PALB2, PDGFRA, PMS2, POLD1, POLE, PTEN, RAD51C, RAD51D, SDHA, SDHB, SDHC, SDHD, SMAD4, SMARCA4, STK11, TP53, TSC1, TSC2, VHL).    We discussed that many of these genes are associated with specific hereditary cancer syndromes and published management guidelines: Hereditary Breast and Ovarian Cancer syndrome (BRCA1, BRCA2), Franklin syndrome (MLH1, MSH2, MSH6, PMS2, EPCAM), Familial Adenomatous Polyposis (APC), Hereditary Diffuse Gastric Cancer (CDH1), Familial Atypical Multiple Mole Melanoma syndrome (CDK4, CDKN2A), Multiple Endocrine Neoplasia type 1 (MEN1), Juvenile Polyposis syndrome (BMPR1A, SMAD4), Cowden syndrome (PTEN), Li Fraumeni syndrome (TP53), Hereditary Paraganglioma and Pheochromocytoma syndrome (SDHA, SDHB, SDHC, SDHD), Peutz-Jeghers syndrome (STK11), MUTYH Associated Polyposis (MUTYH), Tuberous sclerosis complex (TSC1, TSC2), Von Hippel-Lindau disease (VHL), and Neurofibromatosis type 1 (NF1).   The JOSEFA, AXIN2, BAP1, BRIP1, CHEK2, FH, GREM1, MBD4, MSH3, NTHL1, PALB2, POLD1, POLE, RAD51C, and RAD51D genes are associated with increased cancer risk and have published management guidelines for certain cancers.   The remaining genes (BARD1, CTNNA1, DICER1, HOXB13, KIT, PDGFRA, and SMARCA4) are associated with increased cancer risk and may allow us to make medical recommendations when mutations are identified.   Due to COVID-19  precautions consent was obtained over the phone/video today. Genetic testing via a Custom Panel (a combination of the Common Hereditary Cancers Panel + Hereditary Skin Cancer Panel, including preliminary-evidence genes for skin cancer + Renal/Urinary Tract Cancers Panel) will be sent to [facility]. Stas opted to schedule a blood draw for testing. Turnaround time from date when sample is received at the lab: approximately 3-4 weeks.  Medical Management: For Stas, we reviewed that the information from genetic testing may determine:  additional cancer screening for which Stas may qualify (i.e. breast screening, more frequent colonoscopies, more frequent dermatologic exams, etc.),  and targeted chemotherapies if he were to develop certain cancers in the future (i.e. immunotherapy for individuals with Franklin syndrome, PARP inhibitors, etc.).   These recommendations will be discussed in detail once genetic testing is completed.     Plan:  1) Today Stas elected to proceed with genetic testing via a Custom Panel (a combination of the Common Hereditary Cancers Panel + Hereditary Skin Cancer Panel, including preliminary-evidence genes for skin cancer + Renal/Urinary Tract Cancers Panel) offered by [facility].  2) This information should be available in 4-5 weeks.  3) Stas will be scheduled for a virtual visit (phone or video) to discuss the results.    I spent 58 minutes on the date of the encounter doing chart review, virtual visit, documentation, and further activities as noted above.      Aury Reilly MS, [facility]  Licensed, Certified Genetic Counselor  Office: [phone]  Email: [email or web address]       Again, thank you for allowing me to participate in the care of your patient.        Sincerely,        Aury Reilly GC    Electronically signed

## 2025-05-20 ENCOUNTER — TRANSFERRED RECORDS (OUTPATIENT)
Dept: HEALTH INFORMATION MANAGEMENT | Facility: CLINIC | Age: 35
End: 2025-05-20

## 2025-05-20 ENCOUNTER — LAB (OUTPATIENT)
Dept: LAB | Facility: CLINIC | Age: 35
End: 2025-05-20
Payer: COMMERCIAL

## 2025-05-20 DIAGNOSIS — Z80.3 FAMILY HISTORY OF MALIGNANT NEOPLASM OF BREAST: ICD-10-CM

## 2025-05-20 DIAGNOSIS — Z80.8 FAMILY HISTORY OF MELANOMA: ICD-10-CM

## 2025-05-20 DIAGNOSIS — Z80.42 FAMILY HISTORY OF PROSTATE CANCER: ICD-10-CM

## 2025-05-20 DIAGNOSIS — Z80.51 FAMILY HISTORY OF KIDNEY CANCER: ICD-10-CM

## 2025-05-20 DIAGNOSIS — Z80.9 FAMILY HISTORY OF CANCER: ICD-10-CM

## 2025-05-20 LAB
LAB ORDER RESULT STATUS: NORMAL
PERFORMING LABORATORY: NORMAL
TEST NAME: NORMAL

## 2025-05-20 PROCEDURE — 36415 COLL VENOUS BLD VENIPUNCTURE: CPT

## 2025-05-20 PROCEDURE — 99000 SPECIMEN HANDLING OFFICE-LAB: CPT

## 2025-06-03 LAB
SCANNED LAB RESULT: NORMAL
TEST NAME: NORMAL

## 2025-07-02 ENCOUNTER — VIRTUAL VISIT (OUTPATIENT)
Dept: ONCOLOGY | Facility: CLINIC | Age: 35
End: 2025-07-02
Attending: GENETIC COUNSELOR, MS
Payer: COMMERCIAL

## 2025-07-02 DIAGNOSIS — Z80.8 FAMILY HISTORY OF MELANOMA: ICD-10-CM

## 2025-07-02 DIAGNOSIS — Z80.3 FAMILY HISTORY OF MALIGNANT NEOPLASM OF BREAST: Primary | ICD-10-CM

## 2025-07-02 DIAGNOSIS — Z80.9 FAMILY HISTORY OF CANCER: ICD-10-CM

## 2025-07-02 DIAGNOSIS — Z80.51 FAMILY HISTORY OF KIDNEY CANCER: ICD-10-CM

## 2025-07-02 DIAGNOSIS — Z80.42 FAMILY HISTORY OF PROSTATE CANCER: ICD-10-CM

## 2025-07-02 PROCEDURE — 999N000069 HC STATISTIC GENETIC COUNSELING, < 16 MIN: Mod: GT,95 | Performed by: GENETIC COUNSELOR, MS

## 2025-07-02 NOTE — LETTER
7/2/2025      Stas Hollis  [address]  [place] [address]      Dear Colleague,    Thank you for referring your patient, Stas Hollis, to the [facility]. Please see a copy of my visit note below.    Virtual Visit Details    Type of service:  Video Visit     Originating Location (pt. Location): Home  Distant Location (provider location):  Off-site  Platform used for Video Visit: Well    7/2/2025    Referring Provider: Camila Calderon MD     Presenting Information:  I spoke with Stas over video to discuss his genetic testing results. His blood was drawn on 5/20/25. A Custom Panel (a combination of the Common Hereditary Cancers Panel + Hereditary Skin Cancer Panel, including preliminary-evidence genes for skin cancer + Renal/Urinary Tract Cancers Panel) was ordered from [facility]. This testing was done because of his family history of cancer.    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 This variant is called c.92+5dup (Intronic). Given the uncertain significance of this result, medical management decisions should NOT be made based on this test result alone.    Of note, Stas tested negative for mutations or variants of uncertain significance in the following genes by sequencing and deletion/duplication analysis: APC, JOSEFA, AXIN2, BAP1, BARD1, BLM, BMPR1A, BRCA1, BRCA2, BRIP1, BUB1B, CDC73, CDH1, CDK4, CDKN1C, CDKN2A, CEP57, CHEK2, CTNNA1, DICER1, DIS3L2, EPCAM, FH, FLCN, GPC3, GREM1, HOXB13, KIT, MBD4, MC1R, MEN1, MET, MITF, MLH1, MSH2, MSH3, MSH6, MUTYH, NF1, NTHL1, PALB2, PDGFRA, PMS2, POLD1, POLE, POT1, PTCH1, PTCH2, PTEN, RAD51C, RAD51D, RB1, REST, SDHA, SDHB, SDHC, SDHD, SMAD4, SMARCA4, SMARCB1, STK11, SUFU, TERT, TP53, TRIM28, TSC1, TSC2, VHL, WRN, WT1. We reviewed the autosomal dominant inheritance of these genes. Stas cannot  "pass on a mutation in any of these genes to his children based on this test result. Mutations in these genes do not skip generations.      A copy of the test report can be found in the Laboratory tab, dated 5/20/25, and named \"LABORATORY MISCELLANEOUS RESULT.\"  The report is scanned in as a linked document.    Interpretation:  We discussed several different interpretations of this inconclusive test result. It is not clear if this variant in the TRIP13 gene is associated with increased cancer risk.  1. This variant may be a benign change that does not increase cancer risk.  2. This variant may be a harmful mutation that causes an increased risk for cancer.  We discussed that known pathogenic (harmful) mutations in the TRIP13 gene are associated with autosomal recessive mosaic variegated aneuploidy syndrome and female infertility due to oocyte maturation arrest. Mosaic variegated aneuploidy syndrome is a rare condition characterized by problems with cell division and features including microcephaly, growth deficiency and short stature, mild physical abnormalities, eye abnormalities, problems with the brain and central nervous system, seizures, developmental delay, intellectual disability, and an increased risk for cancer. In rare situations in which both parents have a harmful mutation in the TRIP13 gene, their children each have a 25% risk for these conditions. If this variant is later classified as harmful, Stas would be considered a carrier for mosaic variegated aneuploidy syndrome and female infertility. In that case, genetic counseling and genetic testing may be advised for his partner.    Genetic testing labs are working to collect evidence about if this variant is harmful or benign, and they will contact me if it is reclassified. If this variant is determined to be a benign change, there may be a different gene or combination of genes and environment that are associated with the cancers in this family.    It " is also important to consider that his relatives may have a mutation in one of the genes tested and he did not inherit it.      Inheritance:  We reviewed the autosomal dominant inheritance of this variant in the TRIP13 gene. We discussed that Stas has a 50% chance to pass this variant to each of his children. Likewise, each of his siblings has a 50% chance of having the same variant. Because it is unclear what, if any, risk is associated with this variant, clinical genetic testing for this TRIP13 variant alone is not recommended for relatives.    Screening:  Based on this inconclusive test result, it is important for Stas and his relatives to refer back to the family history for appropriate cancer screening.    He should discuss his family history of prostate and renal cancer in his paternal grandfather with his primary care provider to determine most appropriate future screening recommendations.   He should continue with his skin exams as recommended by dermatology based on his personal findings and family history of skin cancers.   Stas's female relatives may remain at increased risk for breast cancer given their family history. Breast cancer screening is generally recommended to begin approximately 10 years younger than the earliest age of breast cancer diagnosis in the family, or at age 40, whichever comes first. Breast screening options should be discussed with an individual's primary care provider and a genetic counselor, to determine at what age to begin screening, what screening is appropriate, and if additional screening (such as breast MRI) is necessary based on personal/family history factors.     Other population cancer screening options, such as those recommended by the American Cancer Society and the National Comprehensive Cancer Network (NCCN), are also appropriate for Stas and his family. These screening recommendations may change if there are changes to Stas's personal and/or family  history of cancer. Final screening recommendations should be made in consultation with each individual's primary care provider.       Additional Testing Considerations:  Although Stas's genetic testing result is inconclusive, other relatives may still carry a harmful gene mutation associated with an increased risk for cancer. Genetic counseling is recommended for his mother (or if she is not interested in testing, Stas's siblings and other maternal relatives) to discuss genetic testing options. If any of his relatives do pursue genetic testing, Stas is encouraged to contact me so that we may review the impact of their test results on him.    Summary:  We do not have an explanation for Stas's family history of cancer. While no obviously harmful genetic changes were identified, Stas may still be at risk for certain cancers due to family history, environmental factors, or other genetic causes not identified by this test. Because of that, it is important that he continue with cancer screening based on his personal and family history as discussed above.    Genetic testing is rapidly advancing, and new cancer susceptibility genes will most likely be identified in the future. Therefore, I encouraged Stas to contact me periodically or if there are changes in his personal or family history. This may change how we assess his cancer risk, screening, and the testing we would offer.    Plan:  1.  Stas has access to a copy of his test results via [employer].    2.  He plans to follow-up with his physicians.  3.  He should contact me regularly, or sooner if his family history changes.  4.  I will contact Stas if the laboratory informs me that this VUS has been reclassified. This may change screening and testing recommendations for Stas and his relatives.    If Stas has any further questions, I encouraged him to contact me at [phone].    I spent 15 minutes on the date of the encounter doing chart  review, history and exam, documentation and further activities as noted above.    Aury Reilly MS, [facility]  Licensed, Certified Genetic Counselor  Office: [phone]  Email: [email or web address]       Again, thank you for allowing me to participate in the care of your patient.        Sincerely,        Aury Reilly GC    Electronically signed

## 2025-07-02 NOTE — PROGRESS NOTES
"Virtual Visit Details    Type of service:  Video Visit     Originating Location (pt. Location): Home  Distant Location (provider location):  Off-site  Platform used for Video Visit: Marylin    7/2/2025    Referring Provider: Camila Calderon MD     Presenting Information:  I spoke with Stas over video to discuss his genetic testing results. His blood was drawn on 5/20/25. A Custom Panel (a combination of the Common Hereditary Cancers Panel + Hereditary Skin Cancer Panel, including preliminary-evidence genes for skin cancer + Renal/Urinary Tract Cancers Panel) was ordered from [facility]. This testing was done because of his family history of cancer.    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 This variant is called c.92+5dup (Intronic). Given the uncertain significance of this result, medical management decisions should NOT be made based on this test result alone.    Of note, Stas tested negative for mutations or variants of uncertain significance in the following genes by sequencing and deletion/duplication analysis: APC, JOSEFA, AXIN2, BAP1, BARD1, BLM, BMPR1A, BRCA1, BRCA2, BRIP1, BUB1B, CDC73, CDH1, CDK4, CDKN1C, CDKN2A, CEP57, CHEK2, CTNNA1, DICER1, DIS3L2, EPCAM, FH, FLCN, GPC3, GREM1, HOXB13, KIT, MBD4, MC1R, MEN1, MET, MITF, MLH1, MSH2, MSH3, MSH6, MUTYH, NF1, NTHL1, PALB2, PDGFRA, PMS2, POLD1, POLE, POT1, PTCH1, PTCH2, PTEN, RAD51C, RAD51D, RB1, REST, SDHA, SDHB, SDHC, SDHD, SMAD4, SMARCA4, SMARCB1, STK11, SUFU, TERT, TP53, TRIM28, TSC1, TSC2, VHL, WRN, WT1. We reviewed the autosomal dominant inheritance of these genes. Stas cannot pass on a mutation in any of these genes to his children based on this test result. Mutations in these genes do not skip generations.      A copy of the test report can be found in the Laboratory tab, dated 5/20/25, and named \"LABORATORY MISCELLANEOUS " "RESULT.\"  The report is scanned in as a linked document.    Interpretation:  We discussed several different interpretations of this inconclusive test result. It is not clear if this variant in the TRIP13 gene is associated with increased cancer risk.  1. This variant may be a benign change that does not increase cancer risk.  2. This variant may be a harmful mutation that causes an increased risk for cancer.  We discussed that known pathogenic (harmful) mutations in the TRIP13 gene are associated with autosomal recessive mosaic variegated aneuploidy syndrome and female infertility due to oocyte maturation arrest. Mosaic variegated aneuploidy syndrome is a rare condition characterized by problems with cell division and features including microcephaly, growth deficiency and short stature, mild physical abnormalities, eye abnormalities, problems with the brain and central nervous system, seizures, developmental delay, intellectual disability, and an increased risk for cancer. In rare situations in which both parents have a harmful mutation in the TRIP13 gene, their children each have a 25% risk for these conditions. If this variant is later classified as harmful, Stas would be considered a carrier for mosaic variegated aneuploidy syndrome and female infertility. In that case, genetic counseling and genetic testing may be advised for his partner.    Genetic testing labs are working to collect evidence about if this variant is harmful or benign, and they will contact me if it is reclassified. If this variant is determined to be a benign change, there may be a different gene or combination of genes and environment that are associated with the cancers in this family.    It is also important to consider that his relatives may have a mutation in one of the genes tested and he did not inherit it.      Inheritance:  We reviewed the autosomal dominant inheritance of this variant in the TRIP13 gene. We discussed that Stas " has a 50% chance to pass this variant to each of his children. Likewise, each of his siblings has a 50% chance of having the same variant. Because it is unclear what, if any, risk is associated with this variant, clinical genetic testing for this TRIP13 variant alone is not recommended for relatives.    Screening:  Based on this inconclusive test result, it is important for Stas and his relatives to refer back to the family history for appropriate cancer screening.    He should discuss his family history of prostate and renal cancer in his paternal grandfather with his primary care provider to determine most appropriate future screening recommendations.   He should continue with his skin exams as recommended by dermatology based on his personal findings and family history of skin cancers.   Stas's female relatives may remain at increased risk for breast cancer given their family history. Breast cancer screening is generally recommended to begin approximately 10 years younger than the earliest age of breast cancer diagnosis in the family, or at age 40, whichever comes first. Breast screening options should be discussed with an individual's primary care provider and a genetic counselor, to determine at what age to begin screening, what screening is appropriate, and if additional screening (such as breast MRI) is necessary based on personal/family history factors.     Other population cancer screening options, such as those recommended by the American Cancer Society and the National Comprehensive Cancer Network (NCCN), are also appropriate for Stas and his family. These screening recommendations may change if there are changes to Stas's personal and/or family history of cancer. Final screening recommendations should be made in consultation with each individual's primary care provider.       Additional Testing Considerations:  Although Stas's genetic testing result is inconclusive, other relatives may  still carry a harmful gene mutation associated with an increased risk for cancer. Genetic counseling is recommended for his mother (or if she is not interested in testing, Stas's siblings and other maternal relatives) to discuss genetic testing options. If any of his relatives do pursue genetic testing, Stas is encouraged to contact me so that we may review the impact of their test results on him.    Summary:  We do not have an explanation for Stas's family history of cancer. While no obviously harmful genetic changes were identified, Stas may still be at risk for certain cancers due to family history, environmental factors, or other genetic causes not identified by this test. Because of that, it is important that he continue with cancer screening based on his personal and family history as discussed above.    Genetic testing is rapidly advancing, and new cancer susceptibility genes will most likely be identified in the future. Therefore, I encouraged Stas to contact me periodically or if there are changes in his personal or family history. This may change how we assess his cancer risk, screening, and the testing we would offer.    Plan:  1.  Stas has access to a copy of his test results via [employer].    2.  He plans to follow-up with his physicians.  3.  He should contact me regularly, or sooner if his family history changes.  4.  I will contact Stas if the laboratory informs me that this VUS has been reclassified. This may change screening and testing recommendations for Stas and his relatives.    If Stas has any further questions, I encouraged him to contact me at [phone].    I spent 15 minutes on the date of the encounter doing chart review, history and exam, documentation and further activities as noted above.    Aury Reilly MS, [facility]  Licensed, Certified Genetic Counselor  Office: [phone]  Email: [email or web address]

## 2025-07-02 NOTE — NURSING NOTE
Is the patient currently in the state of [place]? YES    Current patient location: [address] [place], [place] [address]  [place] [address]    Visit mode:Video    If the visit is dropped, the patient can be reconnected by: VIDEO VISIT: Text to cell phone:   Telephone Information:   Mobile [phone]       Will anyone else be joining the visit? No  (If patient encounters technical issues they should call [phone])    Are changes needed to the allergy or medication list? N/A    Are refills needed on medications prescribed by this physician? No    Rooming Documentation: Questionnaire(s) not done per department protocol.    Reason for visit: RECHECK     Katelyn Astorga, VVF